# Patient Record
Sex: FEMALE | Race: WHITE | Employment: OTHER | URBAN - METROPOLITAN AREA
[De-identification: names, ages, dates, MRNs, and addresses within clinical notes are randomized per-mention and may not be internally consistent; named-entity substitution may affect disease eponyms.]

---

## 2023-11-17 ENCOUNTER — OFFICE VISIT (OUTPATIENT)
Age: 88
End: 2023-11-17
Payer: MEDICARE

## 2023-11-17 VITALS
HEIGHT: 63 IN | OXYGEN SATURATION: 99 % | TEMPERATURE: 98.3 F | RESPIRATION RATE: 16 BRPM | HEART RATE: 83 BPM | BODY MASS INDEX: 22.08 KG/M2 | DIASTOLIC BLOOD PRESSURE: 68 MMHG | SYSTOLIC BLOOD PRESSURE: 138 MMHG | WEIGHT: 124.6 LBS

## 2023-11-17 DIAGNOSIS — R53.83 OTHER FATIGUE: ICD-10-CM

## 2023-11-17 DIAGNOSIS — E53.8 B12 DEFICIENCY: ICD-10-CM

## 2023-11-17 DIAGNOSIS — F32.A DEPRESSION, UNSPECIFIED DEPRESSION TYPE: ICD-10-CM

## 2023-11-17 DIAGNOSIS — E61.1 IRON DEFICIENCY: ICD-10-CM

## 2023-11-17 DIAGNOSIS — E78.5 HYPERLIPIDEMIA, UNSPECIFIED HYPERLIPIDEMIA TYPE: ICD-10-CM

## 2023-11-17 DIAGNOSIS — R00.2 PALPITATION: ICD-10-CM

## 2023-11-17 DIAGNOSIS — Z86.73 HISTORY OF CVA (CEREBROVASCULAR ACCIDENT): ICD-10-CM

## 2023-11-17 DIAGNOSIS — D64.9 ANEMIA, UNSPECIFIED TYPE: Primary | ICD-10-CM

## 2023-11-17 DIAGNOSIS — E55.9 VITAMIN D DEFICIENCY: ICD-10-CM

## 2023-11-17 PROCEDURE — 99204 OFFICE O/P NEW MOD 45 MIN: CPT | Performed by: FAMILY MEDICINE

## 2023-11-17 PROCEDURE — 1123F ACP DISCUSS/DSCN MKR DOCD: CPT | Performed by: FAMILY MEDICINE

## 2023-11-17 RX ORDER — ASPIRIN 81 MG/1
81 TABLET ORAL DAILY
COMMUNITY

## 2023-11-17 RX ORDER — DOXYCYCLINE HYCLATE 100 MG/1
100 CAPSULE ORAL 2 TIMES DAILY
COMMUNITY
Start: 2023-11-10 | End: 2023-11-20

## 2023-11-17 RX ORDER — ATORVASTATIN CALCIUM 10 MG/1
10 TABLET, FILM COATED ORAL DAILY
COMMUNITY

## 2023-11-17 RX ORDER — TRAVOPROST OPHTHALMIC SOLUTION 0.04 MG/ML
SOLUTION OPHTHALMIC
COMMUNITY
Start: 2023-10-11

## 2023-11-17 SDOH — ECONOMIC STABILITY: FOOD INSECURITY: WITHIN THE PAST 12 MONTHS, THE FOOD YOU BOUGHT JUST DIDN'T LAST AND YOU DIDN'T HAVE MONEY TO GET MORE.: NEVER TRUE

## 2023-11-17 SDOH — ECONOMIC STABILITY: INCOME INSECURITY: HOW HARD IS IT FOR YOU TO PAY FOR THE VERY BASICS LIKE FOOD, HOUSING, MEDICAL CARE, AND HEATING?: NOT HARD AT ALL

## 2023-11-17 SDOH — ECONOMIC STABILITY: FOOD INSECURITY: WITHIN THE PAST 12 MONTHS, YOU WORRIED THAT YOUR FOOD WOULD RUN OUT BEFORE YOU GOT MONEY TO BUY MORE.: NEVER TRUE

## 2023-11-17 SDOH — ECONOMIC STABILITY: HOUSING INSECURITY
IN THE LAST 12 MONTHS, WAS THERE A TIME WHEN YOU DID NOT HAVE A STEADY PLACE TO SLEEP OR SLEPT IN A SHELTER (INCLUDING NOW)?: NO

## 2023-11-17 ASSESSMENT — PATIENT HEALTH QUESTIONNAIRE - PHQ9
SUM OF ALL RESPONSES TO PHQ QUESTIONS 1-9: 2
SUM OF ALL RESPONSES TO PHQ QUESTIONS 1-9: 2
2. FEELING DOWN, DEPRESSED OR HOPELESS: 1
SUM OF ALL RESPONSES TO PHQ QUESTIONS 1-9: 2
SUM OF ALL RESPONSES TO PHQ9 QUESTIONS 1 & 2: 2
1. LITTLE INTEREST OR PLEASURE IN DOING THINGS: 1
SUM OF ALL RESPONSES TO PHQ QUESTIONS 1-9: 2

## 2023-11-18 LAB
25(OH)D3 SERPL-MCNC: 51.2 NG/ML (ref 30–100)
BASOPHILS # BLD: 0 K/UL (ref 0–0.1)
BASOPHILS NFR BLD: 0 % (ref 0–1)
CHOLEST SERPL-MCNC: 82 MG/DL
DIFFERENTIAL METHOD BLD: ABNORMAL
EOSINOPHIL # BLD: 0 K/UL (ref 0–0.4)
EOSINOPHIL NFR BLD: 1 % (ref 0–7)
ERYTHROCYTE [DISTWIDTH] IN BLOOD BY AUTOMATED COUNT: 27.1 % (ref 11.5–14.5)
FERRITIN SERPL-MCNC: 533 NG/ML (ref 8–252)
FOLATE SERPL-MCNC: 11.3 NG/ML (ref 5–21)
HCT VFR BLD AUTO: 27.3 % (ref 35–47)
HDLC SERPL-MCNC: 50 MG/DL
HDLC SERPL: 1.6 (ref 0–5)
HGB BLD-MCNC: 7.9 G/DL (ref 11.5–16)
IMM GRANULOCYTES # BLD AUTO: 0 K/UL (ref 0–0.04)
IMM GRANULOCYTES NFR BLD AUTO: 1 % (ref 0–0.5)
IRON SATN MFR SERPL: 94 % (ref 20–50)
IRON SERPL-MCNC: 170 UG/DL (ref 35–150)
LDLC SERPL CALC-MCNC: 24 MG/DL (ref 0–100)
LYMPHOCYTES # BLD: 1.3 K/UL (ref 0.8–3.5)
LYMPHOCYTES NFR BLD: 31 % (ref 12–49)
MCH RBC QN AUTO: 23.8 PG (ref 26–34)
MCHC RBC AUTO-ENTMCNC: 28.9 G/DL (ref 30–36.5)
MCV RBC AUTO: 82.2 FL (ref 80–99)
MONOCYTES # BLD: 0.5 K/UL (ref 0–1)
MONOCYTES NFR BLD: 11 % (ref 5–13)
NEUTS SEG # BLD: 2.3 K/UL (ref 1.8–8)
NEUTS SEG NFR BLD: 56 % (ref 32–75)
NRBC # BLD: 0 K/UL (ref 0–0.01)
NRBC BLD-RTO: 0 PER 100 WBC
PLATELET # BLD AUTO: 398 K/UL (ref 150–400)
RBC # BLD AUTO: 3.32 M/UL (ref 3.8–5.2)
RBC MORPH BLD: ABNORMAL
TIBC SERPL-MCNC: 181 UG/DL (ref 250–450)
TRIGL SERPL-MCNC: 40 MG/DL
TSH SERPL DL<=0.05 MIU/L-ACNC: 4.02 UIU/ML (ref 0.36–3.74)
VIT B12 SERPL-MCNC: 554 PG/ML (ref 193–986)
VLDLC SERPL CALC-MCNC: 8 MG/DL
WBC # BLD AUTO: 4.1 K/UL (ref 3.6–11)

## 2023-11-20 ENCOUNTER — TELEPHONE (OUTPATIENT)
Age: 88
End: 2023-11-20

## 2023-11-20 DIAGNOSIS — E61.1 IRON DEFICIENCY: ICD-10-CM

## 2023-11-20 DIAGNOSIS — D64.9 ANEMIA, UNSPECIFIED TYPE: Primary | ICD-10-CM

## 2023-11-20 DIAGNOSIS — E53.8 B12 DEFICIENCY: ICD-10-CM

## 2023-11-20 NOTE — TELEPHONE ENCOUNTER
Pt's granddaughter Pily notified and voiced understanding.  Pily states her mother will call back to schedule lab appointment

## 2023-11-20 NOTE — TELEPHONE ENCOUNTER
Hemoglobin 7.9 is about where we expect after her blood transfusion. As of now appears to have the building blocks to make new blood cells. However we should repeat these labs in about 2 weeks (lab visit) to make sure that she continues moving in the right direction. It is possible that we will see vitamin deficiencies develop that we will need to be corrected.       Would recommend a hematology (blood doctor referral) to have an extra set of eyes on this    Bran Aburto MD, Hematology/Oncology   [address] [place] [address]   [phone]

## 2023-11-29 ENCOUNTER — NURSE ONLY (OUTPATIENT)
Age: 88
End: 2023-11-29

## 2023-11-29 DIAGNOSIS — D64.9 ANEMIA, UNSPECIFIED TYPE: ICD-10-CM

## 2023-11-29 DIAGNOSIS — E61.1 IRON DEFICIENCY: ICD-10-CM

## 2023-11-29 DIAGNOSIS — E53.8 B12 DEFICIENCY: ICD-10-CM

## 2023-11-29 LAB
FERRITIN SERPL-MCNC: 577 NG/ML (ref 26–388)
FOLATE SERPL-MCNC: 10.7 NG/ML (ref 5–21)
IRON SATN MFR SERPL: 88 % (ref 20–50)
IRON SERPL-MCNC: 171 UG/DL (ref 35–150)
TIBC SERPL-MCNC: 195 UG/DL (ref 250–450)
VIT B12 SERPL-MCNC: 517 PG/ML (ref 193–986)

## 2023-12-04 ENCOUNTER — TELEPHONE (OUTPATIENT)
Age: 88
End: 2023-12-04

## 2023-12-04 DIAGNOSIS — D64.9 ANEMIA, UNSPECIFIED TYPE: Primary | ICD-10-CM

## 2023-12-04 NOTE — TELEPHONE ENCOUNTER
Spoke with [address] [place] [address]. They have the CBC results. They will be faxing over results today 12/4/23.

## 2023-12-05 ENCOUNTER — TELEPHONE (OUTPATIENT)
Age: 88
End: 2023-12-05

## 2023-12-05 RX ORDER — PANTOPRAZOLE SODIUM 40 MG/1
40 TABLET, DELAYED RELEASE ORAL 2 TIMES DAILY
Qty: 60 TABLET | Refills: 1 | Status: SHIPPED | OUTPATIENT
Start: 2023-12-05

## 2023-12-05 NOTE — TELEPHONE ENCOUNTER
Started feeling more tired 12/01 blood count was 6.2    Pt is on her way to [address] [place] [address] [address] for a blood transfusion 12/05

## 2023-12-05 NOTE — TELEPHONE ENCOUNTER
Reviewed results found under media. Lab draw 11/29/2023  Hemoglobin 6.2  MCV 83.1  Platelets 293    This represents a drop in her hemoglobin from 7.9 on 11/17. Called the number on her chart. Turns out the numbers listed are for her daughter and granddaughter. I got touch with her granddaughter Lashonda Mina who is a nurse at [address] She is listed on disclosure. Evidently patient requested help from her family members in navigating the healthcare system. Patient not available to see how she is feeling. Pily has not heard that she has been feeling poorly    Explained my concern is that she is continuing to lose blood. Probably losing it from the GI tract  I advised her to stop aspirin  Start Protonix twice daily for the possibility of an upper GI bleed  She is below the transfusion threshold. Recommend proceeding to the emergency room for a transfusion. Follow-up for a lab visit CBC 1 week after transfusion    She had previously declined to consider GI consult or intervention. Granddaughter will discuss this with patient and would be happy to refer if she changes mind.     Has a hematology appointment next week

## 2023-12-11 ENCOUNTER — OFFICE VISIT (OUTPATIENT)
Age: 88
End: 2023-12-11
Payer: MEDICARE

## 2023-12-11 VITALS
RESPIRATION RATE: 18 BRPM | DIASTOLIC BLOOD PRESSURE: 70 MMHG | WEIGHT: 125.6 LBS | HEIGHT: 63 IN | SYSTOLIC BLOOD PRESSURE: 133 MMHG | TEMPERATURE: 97.6 F | OXYGEN SATURATION: 98 % | BODY MASS INDEX: 22.25 KG/M2

## 2023-12-11 DIAGNOSIS — D61.89 ANEMIA DUE TO OTHER BONE MARROW FAILURE (HCC): Primary | ICD-10-CM

## 2023-12-11 PROCEDURE — 1123F ACP DISCUSS/DSCN MKR DOCD: CPT | Performed by: INTERNAL MEDICINE

## 2023-12-11 PROCEDURE — 99205 OFFICE O/P NEW HI 60 MIN: CPT | Performed by: INTERNAL MEDICINE

## 2023-12-11 NOTE — PROGRESS NOTES
[place]  at [facility], [address], [facility], [address] [place], [place] [address]  [phone]    Hematology Note        Patient: Abbe Marcano MRN: [id number]  SSN: [id number]    YOB: 1935  Age: 80 y.o. Sex: female      Subjective:      Abbe Marcano is a 80 y.o. female who I am seeing for anemia. She has been experiencing progressive dyspnea. Anemia has worsened over the last few months. She has received RBC transfusion which improved the Hgb but it continues to drift downwards. She is fatigued and comes in to discuss the next steps. She is accompanied by her daughter. Review of Systems:  Constitutional: positive for fatigue  Eyes: negative  Ears, Nose, Mouth, Throat, and Face: negative  Respiratory: negative  Cardiovascular: negative  Gastrointestinal: negative  Genitourinary:negative  Integument/Breast: negative  Hematologic/Lymphatic: negative  Musculoskeletal:negative  Neurological: negative      History reviewed. No pertinent past medical history. History reviewed. No pertinent surgical history. History reviewed. No pertinent family history. Social History     Tobacco Use    Smoking status: Never    Smokeless tobacco: Never   Substance Use Topics    Alcohol use: Not Currently      Prior to Admission medications    Medication Sig Start Date End Date Taking?  Authorizing Provider   pantoprazole (PROTONIX) 40 MG tablet Take 1 tablet by mouth in the morning and at bedtime 12/5/23  Yes Lenore Medina MD   atorvastatin (LIPITOR) 10 MG tablet Take 1 tablet by mouth daily   Yes Delores Zavala MD   Travoprost, MAGY Free, (TRAVATAN Z) 0.004 % SOLN ophthalmic solution  10/11/23  Yes Delores Zavala MD   aspirin 81 MG EC tablet Take 1 tablet by mouth daily  Patient not taking: Reported on 12/11/2023    Delores Zavala MD            Allergies   Allergen Reactions    Codeine Other (See Comments)    Diazepam Other

## 2023-12-12 ENCOUNTER — TELEPHONE (OUTPATIENT)
Age: 88
End: 2023-12-12

## 2023-12-12 NOTE — TELEPHONE ENCOUNTER
Called pt daughter. HIPAA verified by two patient identifiers. Fine to go to PCP as we share a system. They will call her PCP and get these drawn.

## 2023-12-12 NOTE — TELEPHONE ENCOUNTER
Pt called left a voicemail. Pt went over to get blood work yesterday they missed them by 5 mins. Pt is wondering can they get their blood work done at Dr. Elvia Peters office in [place], or do they need to come back here.

## 2023-12-12 NOTE — TELEPHONE ENCOUNTER
Pt's daughter calling; pt has a lab appt set for 12/14/2023; pt has active labs in her chart labs (provider is a [facility] provider but not in [facility] office    No call needed at this time

## 2023-12-14 ENCOUNTER — NURSE ONLY (OUTPATIENT)
Age: 88
End: 2023-12-14

## 2023-12-14 ENCOUNTER — HOSPITAL ENCOUNTER (OUTPATIENT)
Facility: HOSPITAL | Age: 88
Discharge: HOME | End: 2023-12-14
Attending: INTERNAL MEDICINE
Payer: MEDICARE

## 2023-12-14 DIAGNOSIS — D61.89 ANEMIA DUE TO OTHER BONE MARROW FAILURE (HCC): ICD-10-CM

## 2023-12-14 LAB
BASOPHILS # BLD: 0 K/UL (ref 0–0.1)
BASOPHILS NFR BLD: 0 % (ref 0–1)
DIFFERENTIAL METHOD BLD: ABNORMAL
EOSINOPHIL # BLD: 0 K/UL (ref 0–0.4)
EOSINOPHIL NFR BLD: 0 % (ref 0–7)
ERYTHROCYTE [DISTWIDTH] IN BLOOD BY AUTOMATED COUNT: 23.3 % (ref 11.5–14.5)
HCT VFR BLD AUTO: 31 % (ref 35–47)
HGB BLD-MCNC: 9.3 G/DL (ref 11.5–16)
IMM GRANULOCYTES # BLD AUTO: 0 K/UL (ref 0–0.04)
IMM GRANULOCYTES NFR BLD AUTO: 1 % (ref 0–0.5)
LDH SERPL L TO P-CCNC: 179 U/L (ref 81–246)
LYMPHOCYTES # BLD: 0.9 K/UL (ref 0.8–3.5)
LYMPHOCYTES NFR BLD: 24 % (ref 12–49)
MCH RBC QN AUTO: 25.8 PG (ref 26–34)
MCHC RBC AUTO-ENTMCNC: 30 G/DL (ref 30–36.5)
MCV RBC AUTO: 86.1 FL (ref 80–99)
MONOCYTES # BLD: 0.3 K/UL (ref 0–1)
MONOCYTES NFR BLD: 7 % (ref 5–13)
NEUTS SEG # BLD: 2.5 K/UL (ref 1.8–8)
NEUTS SEG NFR BLD: 68 % (ref 32–75)
NRBC # BLD: 0 K/UL (ref 0–0.01)
NRBC BLD-RTO: 0 PER 100 WBC
PLATELET # BLD AUTO: 335 K/UL (ref 150–400)
PLATELET COMMENT: ABNORMAL
RBC # BLD AUTO: 3.6 M/UL (ref 3.8–5.2)
RBC MORPH BLD: ABNORMAL
RETICS # AUTO: 0.18 M/UL (ref 0.02–0.08)
RETICS/RBC NFR AUTO: 4.6 % (ref 0.7–2.1)
WBC # BLD AUTO: 3.7 K/UL (ref 3.6–11)

## 2023-12-14 PROCEDURE — 76700 US EXAM ABDOM COMPLETE: CPT

## 2023-12-15 ENCOUNTER — TELEPHONE (OUTPATIENT)
Age: 88
End: 2023-12-15

## 2023-12-16 LAB — EPO SERPL-ACNC: 28.5 MIU/ML (ref 2.6–18.5)

## 2023-12-21 ENCOUNTER — HOSPITAL ENCOUNTER (OUTPATIENT)
Facility: HOSPITAL | Age: 88
Discharge: HOME | End: 2023-12-24
Payer: MEDICARE

## 2023-12-21 VITALS
TEMPERATURE: 98.3 F | RESPIRATION RATE: 17 BRPM | DIASTOLIC BLOOD PRESSURE: 67 MMHG | WEIGHT: 125 LBS | HEART RATE: 76 BPM | BODY MASS INDEX: 22.15 KG/M2 | OXYGEN SATURATION: 100 % | HEIGHT: 63 IN | SYSTOLIC BLOOD PRESSURE: 145 MMHG

## 2023-12-21 DIAGNOSIS — D61.89 ANEMIA DUE TO OTHER BONE MARROW FAILURE (HCC): ICD-10-CM

## 2023-12-21 LAB
ALBUMIN SERPL ELPH-MCNC: 3.7 G/DL (ref 2.9–4.4)
ALBUMIN/GLOB SERPL: 1.3 (ref 0.7–1.7)
ALPHA1 GLOB SERPL ELPH-MCNC: 0.3 G/DL (ref 0–0.4)
ALPHA2 GLOB SERPL ELPH-MCNC: 0.6 G/DL (ref 0.4–1)
B-GLOBULIN SERPL ELPH-MCNC: 1 G/DL (ref 0.7–1.3)
BASOPHILS # BLD: 0 K/UL (ref 0–0.1)
BASOPHILS NFR BLD: 0 % (ref 0–1)
DIFFERENTIAL METHOD BLD: ABNORMAL
EOSINOPHIL # BLD: 0 K/UL (ref 0–0.4)
EOSINOPHIL NFR BLD: 0 % (ref 0–7)
ERYTHROCYTE [DISTWIDTH] IN BLOOD BY AUTOMATED COUNT: 22.9 % (ref 11.5–14.5)
GAMMA GLOB SERPL ELPH-MCNC: 1.2 G/DL (ref 0.4–1.8)
GLOBULIN SER-MCNC: 3 G/DL (ref 2.2–3.9)
HCT VFR BLD AUTO: 26.9 % (ref 35–47)
HGB BLD-MCNC: 8.5 G/DL (ref 11.5–16)
IGA SERPL-MCNC: 526 MG/DL (ref 64–422)
IGG SERPL-MCNC: 1293 MG/DL (ref 586–1602)
IGM SERPL-MCNC: 101 MG/DL (ref 26–217)
IMM GRANULOCYTES # BLD AUTO: 0.1 K/UL (ref 0–0.04)
IMM GRANULOCYTES NFR BLD AUTO: 2 % (ref 0–0.5)
INTERPRETATION SERPL IEP-IMP: ABNORMAL
KAPPA LC FREE SER-MCNC: 56.2 MG/L (ref 3.3–19.4)
KAPPA LC FREE/LAMBDA FREE SER: 1.52 (ref 0.26–1.65)
LAMBDA LC FREE SERPL-MCNC: 36.9 MG/L (ref 5.7–26.3)
LYMPHOCYTES # BLD: 1.4 K/UL (ref 0.8–3.5)
LYMPHOCYTES NFR BLD: 36 % (ref 12–49)
M PROTEIN SERPL ELPH-MCNC: ABNORMAL G/DL
MCH RBC QN AUTO: 26.2 PG (ref 26–34)
MCHC RBC AUTO-ENTMCNC: 31.6 G/DL (ref 30–36.5)
MCV RBC AUTO: 82.8 FL (ref 80–99)
MONOCYTES # BLD: 0.3 K/UL (ref 0–1)
MONOCYTES NFR BLD: 7 % (ref 5–13)
NEUTS SEG # BLD: 2.2 K/UL (ref 1.8–8)
NEUTS SEG NFR BLD: 55 % (ref 32–75)
NRBC # BLD: 0 K/UL (ref 0–0.01)
NRBC BLD-RTO: 0 PER 100 WBC
PLATELET # BLD AUTO: 295 K/UL (ref 150–400)
PMV BLD AUTO: 10.6 FL (ref 8.9–12.9)
PROT SERPL-MCNC: 6.7 G/DL (ref 6–8.5)
RBC # BLD AUTO: 3.25 M/UL (ref 3.8–5.2)
RBC MORPH BLD: ABNORMAL
WBC # BLD AUTO: 4 K/UL (ref 3.6–11)

## 2023-12-21 PROCEDURE — 6360000002 HC RX W HCPCS: Performed by: STUDENT IN AN ORGANIZED HEALTH CARE EDUCATION/TRAINING PROGRAM

## 2023-12-21 PROCEDURE — 36415 COLL VENOUS BLD VENIPUNCTURE: CPT

## 2023-12-21 PROCEDURE — 88313 SPECIAL STAINS GROUP 2: CPT

## 2023-12-21 PROCEDURE — 85025 COMPLETE CBC W/AUTO DIFF WBC: CPT

## 2023-12-21 PROCEDURE — 88341 IMHCHEM/IMCYTCHM EA ADD ANTB: CPT

## 2023-12-21 PROCEDURE — 88342 IMHCHEM/IMCYTCHM 1ST ANTB: CPT

## 2023-12-21 PROCEDURE — 88185 FLOWCYTOMETRY/TC ADD-ON: CPT

## 2023-12-21 PROCEDURE — 88311 DECALCIFY TISSUE: CPT

## 2023-12-21 PROCEDURE — 2580000003 HC RX 258: Performed by: STUDENT IN AN ORGANIZED HEALTH CARE EDUCATION/TRAINING PROGRAM

## 2023-12-21 PROCEDURE — 88305 TISSUE EXAM BY PATHOLOGIST: CPT

## 2023-12-21 PROCEDURE — 88184 FLOWCYTOMETRY/ TC 1 MARKER: CPT

## 2023-12-21 PROCEDURE — 99156 MOD SED OTH PHYS/QHP 5/>YRS: CPT

## 2023-12-21 RX ORDER — SODIUM CHLORIDE 9 MG/ML
INJECTION, SOLUTION INTRAVENOUS CONTINUOUS
Status: DISCONTINUED | OUTPATIENT
Start: 2023-12-21 | End: 2023-12-21

## 2023-12-21 RX ORDER — FENTANYL CITRATE 50 UG/ML
100 INJECTION, SOLUTION INTRAMUSCULAR; INTRAVENOUS
Status: DISCONTINUED | OUTPATIENT
Start: 2023-12-21 | End: 2023-12-21

## 2023-12-21 RX ORDER — MIDAZOLAM HYDROCHLORIDE 1 MG/ML
5 INJECTION, SOLUTION INTRAMUSCULAR; INTRAVENOUS
Status: DISCONTINUED | OUTPATIENT
Start: 2023-12-21 | End: 2023-12-21

## 2023-12-21 RX ADMIN — SODIUM CHLORIDE: 9 INJECTION, SOLUTION INTRAVENOUS at 12:14

## 2023-12-21 RX ADMIN — FENTANYL CITRATE 50 MCG: 50 INJECTION INTRAMUSCULAR; INTRAVENOUS at 12:39

## 2023-12-21 RX ADMIN — MIDAZOLAM 2 MG: 1 INJECTION INTRAMUSCULAR; INTRAVENOUS at 12:35

## 2023-12-21 RX ADMIN — FENTANYL CITRATE 50 MCG: 50 INJECTION INTRAMUSCULAR; INTRAVENOUS at 12:36

## 2023-12-21 NOTE — DISCHARGE INSTRUCTIONS
[facility]  [facility]iology [facility]  [phone]    Radiologist:  Psychiatric Radiology    Date:  12/21/2023       Bone Marrow Biopsy Discharge Instructions      Go home and rest  and restrict your activity the next 24 hours. You have been given sedating medications, so do not drive or make important decisions today. Resume your previous diet and prescribed medications. You may shower in 24 hours. Do not soak or swim until the biopsy site has healed completely to minimize any risk of infection. Watch site for redness, drainage, pus, foul odor, increasing pain and fevers. Should this occur call you doctor immediatly. You may take Tylenol if allowed, as directed on the label, for pain or discomfort. Avoid Ibuprofen (Advil, Motrin etc.) and Aspirin today as they may increase your risk of bleeding. Be sure to follow up with your physician, and let him know how you are progressing and to receive your results. If you have any questions about your procedure today please call and ask to speak to a radiology nurse.        I

## 2024-01-02 NOTE — PROGRESS NOTES
Esther Kaiser is a 88 y.o. female here for follow up for anemia.  Bone marrow biopsy 12/21/23.   Pt just starting with the cough and congestion. She is more fatigued.   Will see cardiologist on the 11th.    1. Have you been to the ER, urgent care clinic since your last visit?  Hospitalized since your last visit? no    2. Have you seen or consulted any other health care providers outside of the [facility] since your last visit?  Include any pap smears or colon screening.  no

## 2024-01-04 ENCOUNTER — OFFICE VISIT (OUTPATIENT)
Age: 89
End: 2024-01-04
Payer: MEDICARE

## 2024-01-04 VITALS
WEIGHT: 127.8 LBS | OXYGEN SATURATION: 96 % | HEART RATE: 95 BPM | SYSTOLIC BLOOD PRESSURE: 138 MMHG | DIASTOLIC BLOOD PRESSURE: 68 MMHG | TEMPERATURE: 98.2 F | BODY MASS INDEX: 22.64 KG/M2 | HEIGHT: 63 IN

## 2024-01-04 DIAGNOSIS — D46.9 MDS (MYELODYSPLASTIC SYNDROME) (HCC): Primary | ICD-10-CM

## 2024-01-04 DIAGNOSIS — D46.4 REFRACTORY ANEMIA DUE TO MYELODYSPLASTIC SYNDROME (HCC): Primary | ICD-10-CM

## 2024-01-04 PROCEDURE — 99215 OFFICE O/P EST HI 40 MIN: CPT | Performed by: INTERNAL MEDICINE

## 2024-01-04 PROCEDURE — 1123F ACP DISCUSS/DSCN MKR DOCD: CPT | Performed by: INTERNAL MEDICINE

## 2024-01-04 NOTE — PROGRESS NOTES
[facility] [place]  at [facility]  [address] [facility], [address]  [place] [address]  [phone]    Hematology Note        Patient: Esther Kaiser MRN: [id number]  SSN: [id number]    YOB: 1935  Age: 88 y.o.  Sex: female      Subjective:      Esther Kaiser is a 88 y.o. female who I am seeing for anemia. She has been experiencing progressive dyspnea. Anemia has worsened over the last few months. She has received RBC transfusion which improved the Hgb but it continues to drift downwards. She is fatigued. A bone marrow Bx reveals a Dx of MDS. She is accompanied by her daughter.       Review of Systems:  Constitutional: positive for fatigue  Eyes: negative  Ears, Nose, Mouth, Throat, and Face: negative  Respiratory: negative  Cardiovascular: negative  Gastrointestinal: negative  Genitourinary:negative  Integument/Breast: negative  Hematologic/Lymphatic: negative  Musculoskeletal:negative  Neurological: negative      No past medical history on file.  No past surgical history on file.   No family history on file.  Social History     Tobacco Use    Smoking status: Never    Smokeless tobacco: Never   Substance Use Topics    Alcohol use: Not Currently      Prior to Admission medications    Medication Sig Start Date End Date Taking? Authorizing Provider   pantoprazole (PROTONIX) 40 MG tablet Take 1 tablet by mouth in the morning and at bedtime 12/5/23  Yes Messi Stock MD   atorvastatin (LIPITOR) 10 MG tablet Take 1 tablet by mouth daily   Yes Delores Zavala MD   Travoprost, MAGY Free, (TRAVATAN Z) 0.004 % SOLN ophthalmic solution  10/11/23  Yes Delores Zavala MD   aspirin 81 MG EC tablet Take 1 tablet by mouth daily  Patient not taking: Reported on 12/11/2023    ProviderDelores MD            Allergies   Allergen Reactions    Codeine Other (See Comments)    Diazepam Other (See Comments)    Penicillins Other (See Comments)

## 2024-01-05 DIAGNOSIS — D46.4 REFRACTORY ANEMIA DUE TO MYELODYSPLASTIC SYNDROME (HCC): Primary | ICD-10-CM

## 2024-01-05 RX ORDER — DIPHENHYDRAMINE HYDROCHLORIDE 50 MG/ML
50 INJECTION INTRAMUSCULAR; INTRAVENOUS
OUTPATIENT
Start: 2024-01-18

## 2024-01-05 RX ORDER — ALBUTEROL SULFATE 90 UG/1
4 AEROSOL, METERED RESPIRATORY (INHALATION) PRN
OUTPATIENT
Start: 2024-01-18

## 2024-01-05 RX ORDER — SODIUM CHLORIDE 9 MG/ML
INJECTION, SOLUTION INTRAVENOUS CONTINUOUS
OUTPATIENT
Start: 2024-01-18

## 2024-01-05 RX ORDER — ACETAMINOPHEN 325 MG/1
650 TABLET ORAL
OUTPATIENT
Start: 2024-01-18

## 2024-01-05 RX ORDER — EPINEPHRINE 1 MG/ML
0.3 INJECTION, SOLUTION, CONCENTRATE INTRAVENOUS PRN
OUTPATIENT
Start: 2024-01-18

## 2024-01-05 RX ORDER — ONDANSETRON 2 MG/ML
8 INJECTION INTRAMUSCULAR; INTRAVENOUS
OUTPATIENT
Start: 2024-01-18

## 2024-01-08 ENCOUNTER — TELEPHONE (OUTPATIENT)
Age: 89
End: 2024-01-08

## 2024-01-08 NOTE — TELEPHONE ENCOUNTER
Pt daughter called and left a VM on 1/8/24. She stated they left without getting bone marrow results. Wondering if they could be email to call her and let her know.

## 2024-01-10 DIAGNOSIS — D46.4 REFRACTORY ANEMIA DUE TO MYELODYSPLASTIC SYNDROME (HCC): Primary | ICD-10-CM

## 2024-01-10 RX ORDER — ATORVASTATIN CALCIUM 10 MG/1
10 TABLET, FILM COATED ORAL DAILY
Qty: 90 TABLET | Refills: 3 | Status: SHIPPED | OUTPATIENT
Start: 2024-01-10

## 2024-01-10 NOTE — TELEPHONE ENCOUNTER
Refill request (pt daughter calling)    Pt is out    atorvastatin (LIPITOR) 10 MG tablet     [facility] in [place]

## 2024-01-10 NOTE — TELEPHONE ENCOUNTER
Patients daughter called back returning a call. Informed her that the nurse called to verify the email on file. Email is correct that is on file.     She also informed me that she missed a call about scheduling an appointment for a shot.    # [phone]

## 2024-01-11 ENCOUNTER — CLINICAL DOCUMENTATION (OUTPATIENT)
Facility: HOSPITAL | Age: 89
End: 2024-01-11

## 2024-01-11 NOTE — PROGRESS NOTES
Patient Assistance    Met with:     Navigator Type: Infusion  Documentation Type: New Patient  Contact Type: Telephone  Navigation Status: Copay Enrollment          Additional notes:     Drug Name: OTHER  Other Drug Name: Reblozyl  Form of PAP Assistance: [employer] Assistance

## 2024-01-15 ENCOUNTER — TELEPHONE (OUTPATIENT)
Age: 89
End: 2024-01-15

## 2024-01-15 NOTE — TELEPHONE ENCOUNTER
Verito left a VM on 1/15/24 at 8:35am. She needs to reschedule her mothers infusion appt on 1/19/24.

## 2024-01-19 ENCOUNTER — HOSPITAL ENCOUNTER (OUTPATIENT)
Facility: HOSPITAL | Age: 89
Setting detail: INFUSION SERIES
End: 2024-01-19
Payer: MEDICARE

## 2024-01-22 ENCOUNTER — OFFICE VISIT (OUTPATIENT)
Age: 89
End: 2024-01-22
Payer: MEDICARE

## 2024-01-22 ENCOUNTER — HOSPITAL ENCOUNTER (OUTPATIENT)
Facility: HOSPITAL | Age: 89
Setting detail: INFUSION SERIES
Discharge: HOME | End: 2024-01-22
Payer: MEDICARE

## 2024-01-22 VITALS
OXYGEN SATURATION: 96 % | WEIGHT: 124.3 LBS | HEIGHT: 63 IN | RESPIRATION RATE: 17 BRPM | BODY MASS INDEX: 22.02 KG/M2 | SYSTOLIC BLOOD PRESSURE: 146 MMHG | DIASTOLIC BLOOD PRESSURE: 73 MMHG | HEART RATE: 96 BPM | TEMPERATURE: 96.5 F

## 2024-01-22 VITALS
OXYGEN SATURATION: 97 % | BODY MASS INDEX: 22.03 KG/M2 | WEIGHT: 124.34 LBS | HEIGHT: 63 IN | SYSTOLIC BLOOD PRESSURE: 148 MMHG | HEART RATE: 137 BPM | RESPIRATION RATE: 16 BRPM | DIASTOLIC BLOOD PRESSURE: 77 MMHG | TEMPERATURE: 96.5 F

## 2024-01-22 DIAGNOSIS — D46.4 REFRACTORY ANEMIA DUE TO MYELODYSPLASTIC SYNDROME (HCC): ICD-10-CM

## 2024-01-22 DIAGNOSIS — D46.4 REFRACTORY ANEMIA DUE TO MYELODYSPLASTIC SYNDROME (HCC): Primary | ICD-10-CM

## 2024-01-22 DIAGNOSIS — D46.9 MDS (MYELODYSPLASTIC SYNDROME) (HCC): Primary | ICD-10-CM

## 2024-01-22 DIAGNOSIS — D61.89 ANEMIA DUE TO OTHER BONE MARROW FAILURE (HCC): ICD-10-CM

## 2024-01-22 DIAGNOSIS — Z51.11 ENCOUNTER FOR ANTINEOPLASTIC CHEMOTHERAPY: ICD-10-CM

## 2024-01-22 LAB
BASOPHILS # BLD: 0 K/UL (ref 0–0.1)
BASOPHILS NFR BLD: 0 % (ref 0–1)
DIFFERENTIAL METHOD BLD: ABNORMAL
EOSINOPHIL # BLD: 0 K/UL (ref 0–0.4)
EOSINOPHIL NFR BLD: 0 % (ref 0–7)
ERYTHROCYTE [DISTWIDTH] IN BLOOD BY AUTOMATED COUNT: 22.7 % (ref 11.5–14.5)
HCT VFR BLD AUTO: 19.4 % (ref 35–47)
HGB BLD-MCNC: 5.8 G/DL (ref 11.5–16)
HISTORY CHECK: NORMAL
IMM GRANULOCYTES # BLD AUTO: 0 K/UL (ref 0–0.04)
IMM GRANULOCYTES NFR BLD AUTO: 0 % (ref 0–0.5)
LYMPHOCYTES # BLD: 1.2 K/UL (ref 0.8–3.5)
LYMPHOCYTES NFR BLD: 25 % (ref 12–49)
MCH RBC QN AUTO: 25.4 PG (ref 26–34)
MCHC RBC AUTO-ENTMCNC: 29.9 G/DL (ref 30–36.5)
MCV RBC AUTO: 85.1 FL (ref 80–99)
METAMYELOCYTES NFR BLD MANUAL: 2 %
MONOCYTES # BLD: 0.1 K/UL (ref 0–1)
MONOCYTES NFR BLD: 3 % (ref 5–13)
NEUTS BAND NFR BLD MANUAL: 1 %
NEUTS SEG # BLD: 3.4 K/UL (ref 1.8–8)
NEUTS SEG NFR BLD: 69 % (ref 32–75)
NRBC # BLD: 0.07 K/UL (ref 0–0.01)
NRBC BLD-RTO: 1.4 PER 100 WBC
PATH REV BLD -IMP: NORMAL
PLATELET # BLD AUTO: 189 K/UL (ref 150–400)
RBC # BLD AUTO: 2.28 M/UL (ref 3.8–5.2)
RBC MORPH BLD: ABNORMAL
WBC # BLD AUTO: 4.8 K/UL (ref 3.6–11)

## 2024-01-22 PROCEDURE — 85025 COMPLETE CBC W/AUTO DIFF WBC: CPT

## 2024-01-22 PROCEDURE — 96372 THER/PROPH/DIAG INJ SC/IM: CPT

## 2024-01-22 PROCEDURE — 88275 CYTOGENETICS 100-300: CPT

## 2024-01-22 PROCEDURE — 86850 RBC ANTIBODY SCREEN: CPT

## 2024-01-22 PROCEDURE — 86923 COMPATIBILITY TEST ELECTRIC: CPT

## 2024-01-22 PROCEDURE — 86901 BLOOD TYPING SEROLOGIC RH(D): CPT

## 2024-01-22 PROCEDURE — 6360000002 HC RX W HCPCS: Performed by: INTERNAL MEDICINE

## 2024-01-22 PROCEDURE — 36415 COLL VENOUS BLD VENIPUNCTURE: CPT

## 2024-01-22 PROCEDURE — 86900 BLOOD TYPING SEROLOGIC ABO: CPT

## 2024-01-22 PROCEDURE — 99215 OFFICE O/P EST HI 40 MIN: CPT | Performed by: INTERNAL MEDICINE

## 2024-01-22 PROCEDURE — 88271 CYTOGENETICS DNA PROBE: CPT

## 2024-01-22 PROCEDURE — 81270 JAK2 GENE: CPT

## 2024-01-22 PROCEDURE — 1123F ACP DISCUSS/DSCN MKR DOCD: CPT | Performed by: INTERNAL MEDICINE

## 2024-01-22 RX ORDER — ACETAMINOPHEN 325 MG/1
650 TABLET ORAL
OUTPATIENT
Start: 2024-01-23

## 2024-01-22 RX ORDER — ACETAMINOPHEN 325 MG/1
650 TABLET ORAL
OUTPATIENT
Start: 2024-01-26

## 2024-01-22 RX ORDER — EPINEPHRINE 1 MG/ML
0.3 INJECTION, SOLUTION INTRAMUSCULAR; SUBCUTANEOUS PRN
OUTPATIENT
Start: 2024-01-26

## 2024-01-22 RX ORDER — ALBUTEROL SULFATE 90 UG/1
4 AEROSOL, METERED RESPIRATORY (INHALATION) PRN
OUTPATIENT
Start: 2024-01-23

## 2024-01-22 RX ORDER — DIPHENHYDRAMINE HCL 25 MG
25 TABLET ORAL ONCE
Status: CANCELLED | OUTPATIENT
Start: 2024-01-23 | End: 2024-01-23

## 2024-01-22 RX ORDER — EPINEPHRINE 1 MG/ML
0.3 INJECTION, SOLUTION, CONCENTRATE INTRAVENOUS PRN
OUTPATIENT
Start: 2024-01-23

## 2024-01-22 RX ORDER — SODIUM CHLORIDE 9 MG/ML
INJECTION, SOLUTION INTRAVENOUS CONTINUOUS
OUTPATIENT
Start: 2024-01-23

## 2024-01-22 RX ORDER — DIPHENHYDRAMINE HYDROCHLORIDE 50 MG/ML
50 INJECTION INTRAMUSCULAR; INTRAVENOUS
OUTPATIENT
Start: 2024-01-26

## 2024-01-22 RX ORDER — DIPHENHYDRAMINE HYDROCHLORIDE 50 MG/ML
50 INJECTION INTRAMUSCULAR; INTRAVENOUS
OUTPATIENT
Start: 2024-01-23

## 2024-01-22 RX ORDER — SODIUM CHLORIDE 9 MG/ML
20 INJECTION, SOLUTION INTRAVENOUS CONTINUOUS
Status: CANCELLED | OUTPATIENT
Start: 2024-01-23

## 2024-01-22 RX ORDER — SODIUM CHLORIDE 0.9 % (FLUSH) 0.9 %
5-40 SYRINGE (ML) INJECTION PRN
Status: CANCELLED | OUTPATIENT
Start: 2024-01-23

## 2024-01-22 RX ORDER — ALBUTEROL SULFATE 90 UG/1
4 AEROSOL, METERED RESPIRATORY (INHALATION) PRN
OUTPATIENT
Start: 2024-01-26

## 2024-01-22 RX ORDER — ACETAMINOPHEN 325 MG/1
650 TABLET ORAL ONCE
Status: CANCELLED | OUTPATIENT
Start: 2024-01-23 | End: 2024-01-23

## 2024-01-22 RX ORDER — SODIUM CHLORIDE 9 MG/ML
INJECTION, SOLUTION INTRAVENOUS CONTINUOUS
OUTPATIENT
Start: 2024-01-26

## 2024-01-22 RX ORDER — ONDANSETRON 2 MG/ML
8 INJECTION INTRAMUSCULAR; INTRAVENOUS
OUTPATIENT
Start: 2024-01-26

## 2024-01-22 RX ORDER — ONDANSETRON 2 MG/ML
8 INJECTION INTRAMUSCULAR; INTRAVENOUS
OUTPATIENT
Start: 2024-01-23

## 2024-01-22 RX ORDER — SODIUM CHLORIDE 9 MG/ML
25 INJECTION, SOLUTION INTRAVENOUS PRN
OUTPATIENT
Start: 2024-01-23

## 2024-01-22 RX ADMIN — LUSPATERCEPT 60 MG: 75 INJECTION, POWDER, LYOPHILIZED, FOR SOLUTION SUBCUTANEOUS at 10:59

## 2024-01-22 NOTE — PROGRESS NOTES
Esther Kaiser is a 88 y.o. female here for follow up for anemia.  Getting Luspatercept today.   Pt doing well. No concerns brought up.     1. Have you been to the ER, urgent care clinic since your last visit?  Hospitalized since your last visit?     2. Have you seen or consulted any other health care providers outside of the [facility] since your last visit?  Include any pap smears or colon screening.  [facility], Dr Evelyn Temple  
Diazepam Other (See Comments)    Penicillins Other (See Comments)     Tongue Swelling    Sulfa Antibiotics Other (See Comments)     Hyper           Objective:     Vitals:    01/22/24 0910   BP: (!) 148/77   Pulse: (!) 137   Resp: 16   Temp: (!) 96.5 °F (35.8 °C)   SpO2: 97%   Weight: 56.4 kg (124 lb 5.4 oz)   Height: 1.6 m (5' 2.99\")        PHYSICAL EXAM:    GENERAL: alert, cooperative, no distress, appears stated age  EYE: conjunctivae/corneas clear, pallor  LYMPHATIC: Cervical, supraclavicular, and axillary nodes normal.   THROAT & NECK: normal and no erythema or exudates noted.   LUNG: clear to auscultation bilaterally  HEART: regular rate and rhythm  ABDOMEN: soft, non-tender, non-distended.   EXTREMITIES:  extremities normal, atraumatic, no cyanosis or edema  SKIN: Normal.  NEUROLOGIC: AOx3. Gait normal. No gross motor/sensory deficit.      Physical exam and ROS has been modified from a prior visit to make it relevant and current          Lab Results   Component Value Date/Time    WBC 4.8 01/22/2024 08:50 AM    HGB 5.8 01/22/2024 08:50 AM    HCT 19.4 01/22/2024 08:50 AM     01/22/2024 08:50 AM    MCV 85.1 01/22/2024 08:50 AM           Lab Results   Component Value Date/Time    IRON 171 11/29/2023 02:11 PM    TIBC 195 11/29/2023 02:11 PM           Assessment:     1. Low grade MDS  ? Chronic MPN    Anemia is worse  Start Luspatercept  Blood based NGS for mutation studies  BCR-abl FISH    I called and discussed the case with hematopathologist, Dr. Shipley. The bone marrow finding is not classical of MDS. There could be an overlap syndrome of MPN/MDS. There is a also a possibility of primary myelofibrosis as well.       Plan:       1. Start Luspatercept  2. Transfuse RBC  3. Blood based NGS - mutation profile  4. BCR-abl FISH  5. Return in 3 weeks  6. May consider sending her to [facility] for an opinion      Signed by: Zander Elena MD                     January 22, 2024

## 2024-01-22 NOTE — PROGRESS NOTES
[facility] Visit Note    Pt arrived to [facility] ambulatory in no acute distress for Reblozyl.  Assessment unremarkable. Labs obtained, CBC, BCR/ABL, JAK2 V617F Cascading Reflex to CALR, JAK2 Exon 12, MPL, & CSF3R, Tempus Kit and sample to BB for PRBC.    The following medications administered:  Medications Administered         Luspatercept-aamt (REBLOZYL) injection 60 mg Admin Date  01/22/2024 Action  Given Dose  60 mg Route  SubCUTAneous Administered By  Sloane Singh RN           Patient Vitals for the past 24 hrs:   BP Temp Temp src Pulse Resp SpO2 Height Weight   01/22/24 1103 (!) 146/73 -- -- 96 17 96 % -- --   01/22/24 0847 (!) 148/77 (!) 96.5 °F (35.8 °C) Temporal (!) 137 16 97 % 1.6 m (5' 2.99\") 56.4 kg (124 lb 4.8 oz)        Pt tolerated treatment well.  Pt discharged ambulatory in no acute distress , accompanied by daughter.  Next appointment 1/23/2024.

## 2024-01-23 ENCOUNTER — HOSPITAL ENCOUNTER (OUTPATIENT)
Facility: HOSPITAL | Age: 89
Setting detail: INFUSION SERIES
Discharge: HOME | End: 2024-01-23
Payer: MEDICARE

## 2024-01-23 ENCOUNTER — CLINICAL DOCUMENTATION (OUTPATIENT)
Age: 89
End: 2024-01-23

## 2024-01-23 VITALS
RESPIRATION RATE: 16 BRPM | TEMPERATURE: 99.1 F | SYSTOLIC BLOOD PRESSURE: 135 MMHG | OXYGEN SATURATION: 100 % | HEART RATE: 90 BPM | DIASTOLIC BLOOD PRESSURE: 52 MMHG

## 2024-01-23 DIAGNOSIS — D46.4 REFRACTORY ANEMIA DUE TO MYELODYSPLASTIC SYNDROME (HCC): Primary | ICD-10-CM

## 2024-01-23 LAB
BASOPHILS # BLD: 0 K/UL (ref 0–0.1)
BASOPHILS NFR BLD: 0 % (ref 0–1)
DIFFERENTIAL METHOD BLD: ABNORMAL
EOSINOPHIL # BLD: 0 K/UL (ref 0–0.4)
EOSINOPHIL NFR BLD: 0 % (ref 0–7)
ERYTHROCYTE [DISTWIDTH] IN BLOOD BY AUTOMATED COUNT: 20.1 % (ref 11.5–14.5)
HCT VFR BLD AUTO: 23.2 % (ref 35–47)
HGB BLD-MCNC: 7.5 G/DL (ref 11.5–16)
IMM GRANULOCYTES # BLD AUTO: 0.2 K/UL (ref 0–0.04)
IMM GRANULOCYTES NFR BLD AUTO: 4 % (ref 0–0.5)
LYMPHOCYTES # BLD: 1.1 K/UL (ref 0.8–3.5)
LYMPHOCYTES NFR BLD: 24 % (ref 12–49)
MCH RBC QN AUTO: 27.1 PG (ref 26–34)
MCHC RBC AUTO-ENTMCNC: 32.3 G/DL (ref 30–36.5)
MCV RBC AUTO: 83.8 FL (ref 80–99)
MONOCYTES # BLD: 0.3 K/UL (ref 0–1)
MONOCYTES NFR BLD: 6 % (ref 5–13)
NEUTS SEG # BLD: 2.9 K/UL (ref 1.8–8)
NEUTS SEG NFR BLD: 66 % (ref 32–75)
NRBC # BLD: 0.06 K/UL (ref 0–0.01)
NRBC BLD-RTO: 1.3 PER 100 WBC
PLATELET # BLD AUTO: 144 K/UL (ref 150–400)
RBC # BLD AUTO: 2.77 M/UL (ref 3.8–5.2)
RBC MORPH BLD: ABNORMAL
WBC # BLD AUTO: 4.5 K/UL (ref 3.6–11)

## 2024-01-23 PROCEDURE — 6370000000 HC RX 637 (ALT 250 FOR IP): Performed by: NURSE PRACTITIONER

## 2024-01-23 PROCEDURE — 36415 COLL VENOUS BLD VENIPUNCTURE: CPT

## 2024-01-23 PROCEDURE — 2580000003 HC RX 258: Performed by: NURSE PRACTITIONER

## 2024-01-23 PROCEDURE — P9016 RBC LEUKOCYTES REDUCED: HCPCS

## 2024-01-23 PROCEDURE — 36430 TRANSFUSION BLD/BLD COMPNT: CPT

## 2024-01-23 PROCEDURE — 85025 COMPLETE CBC W/AUTO DIFF WBC: CPT

## 2024-01-23 RX ORDER — ACETAMINOPHEN 325 MG/1
650 TABLET ORAL ONCE
Status: COMPLETED | OUTPATIENT
Start: 2024-01-23 | End: 2024-01-23

## 2024-01-23 RX ORDER — DIPHENHYDRAMINE HCL 25 MG
25 CAPSULE ORAL ONCE
Status: COMPLETED | OUTPATIENT
Start: 2024-01-23 | End: 2024-01-23

## 2024-01-23 RX ORDER — ALBUTEROL SULFATE 90 UG/1
4 AEROSOL, METERED RESPIRATORY (INHALATION) PRN
OUTPATIENT
Start: 2024-01-23

## 2024-01-23 RX ORDER — ACETAMINOPHEN 325 MG/1
650 TABLET ORAL ONCE
Status: CANCELLED | OUTPATIENT
Start: 2024-01-23 | End: 2024-01-23

## 2024-01-23 RX ORDER — SODIUM CHLORIDE 0.9 % (FLUSH) 0.9 %
5-40 SYRINGE (ML) INJECTION PRN
Status: CANCELLED | OUTPATIENT
Start: 2024-01-23

## 2024-01-23 RX ORDER — ACETAMINOPHEN 325 MG/1
650 TABLET ORAL
OUTPATIENT
Start: 2024-01-23

## 2024-01-23 RX ORDER — DIPHENHYDRAMINE HCL 25 MG
25 CAPSULE ORAL ONCE
Status: CANCELLED | OUTPATIENT
Start: 2024-01-23 | End: 2024-01-23

## 2024-01-23 RX ORDER — EPINEPHRINE 1 MG/ML
0.3 INJECTION, SOLUTION INTRAMUSCULAR; SUBCUTANEOUS PRN
OUTPATIENT
Start: 2024-01-23

## 2024-01-23 RX ORDER — SODIUM CHLORIDE 9 MG/ML
20 INJECTION, SOLUTION INTRAVENOUS CONTINUOUS
Status: CANCELLED | OUTPATIENT
Start: 2024-01-23

## 2024-01-23 RX ORDER — DIPHENHYDRAMINE HYDROCHLORIDE 50 MG/ML
50 INJECTION INTRAMUSCULAR; INTRAVENOUS
OUTPATIENT
Start: 2024-01-23

## 2024-01-23 RX ORDER — SODIUM CHLORIDE 0.9 % (FLUSH) 0.9 %
5-40 SYRINGE (ML) INJECTION PRN
Status: DISCONTINUED | OUTPATIENT
Start: 2024-01-23 | End: 2024-01-24 | Stop reason: HOSPADM

## 2024-01-23 RX ORDER — SODIUM CHLORIDE 9 MG/ML
INJECTION, SOLUTION INTRAVENOUS CONTINUOUS
OUTPATIENT
Start: 2024-01-23

## 2024-01-23 RX ORDER — ONDANSETRON 2 MG/ML
8 INJECTION INTRAMUSCULAR; INTRAVENOUS
OUTPATIENT
Start: 2024-01-23

## 2024-01-23 RX ORDER — SODIUM CHLORIDE 9 MG/ML
20 INJECTION, SOLUTION INTRAVENOUS CONTINUOUS
Status: DISCONTINUED | OUTPATIENT
Start: 2024-01-23 | End: 2024-01-24 | Stop reason: HOSPADM

## 2024-01-23 RX ORDER — SODIUM CHLORIDE 9 MG/ML
25 INJECTION, SOLUTION INTRAVENOUS PRN
OUTPATIENT
Start: 2024-01-23

## 2024-01-23 RX ADMIN — Medication 10 ML: at 13:18

## 2024-01-23 RX ADMIN — SODIUM CHLORIDE 20 ML/HR: 9 INJECTION, SOLUTION INTRAVENOUS at 09:17

## 2024-01-23 RX ADMIN — ACETAMINOPHEN 650 MG: 325 TABLET ORAL at 09:23

## 2024-01-23 RX ADMIN — DIPHENHYDRAMINE HYDROCHLORIDE 25 MG: 25 CAPSULE ORAL at 09:23

## 2024-01-23 NOTE — PROGRESS NOTES
[facility] Progress Note  January 23, 2024    0905  Esther Kaiser arrived ambulatory and in no acute distress for 2 units PRBCs.      Patient COVID Screening completed:  Do you have any symptoms of COVID-19? SOB, coughing, fever, or generally not feeling well? NO  Have you been exposed to COVID-19 recently? NO  Have you had any recent contact with family/friend that has a pending COVID test? NO    Assessment completed, no new complaints voiced.  22G PIV established in RAC without difficulty.      Education provided regarding reason for blood transfusion and possible reactions.  All questions and concerns regarding transfusion answered, patient voiced understanding.    Problem: Safety - Adult  Goal: Free from fall injury  Outcome: Progressing  Flowsheets (Taken 1/23/2024 0909)  Free From Fall Injury: Instruct family/caregiver on patient safety     Problem: Chronic Conditions and Co-morbidities  Goal: Patient's chronic conditions and co-morbidity symptoms are monitored and maintained or improved  Outcome: Progressing     Problem: Decision Making  Goal: Pt/Family able to effectively weigh alternatives and participate in decision making related to treatment and care  Description: INTERVENTIONS:  1. Determine when there are differences between patient's view, family's view, and healthcare provider's view of condition  2. Facilitate patient and family articulation of goals for care  3. Help patient and family identify pros/cons of alternative solutions  4. Provide information as requested by patient/family  5. Respect patient/family right to receive or not to receive information  6. Serve as a liaison between patient and family and health care team  7. Initiate Consults from Ethics, Palliative Care or initiate Family Care Conference as is appropriate  Outcome: Progressing    Medications received:  NS @ KVO  Tylenol 650 mg PO  Benadryl 25 mg PO    0945:  1st unit PRBCs started and infusing without difficulty, observed x 15  minutes  1209:  1st unit completed without adverse reaction noted, NS flushing line.  HGB checked between units per protocol. Hgb 7.4. Patient will not get 2nd unit.    Patient Vitals for the past 12 hrs:   Temp Pulse Resp BP SpO2   01/23/24 0939 99.4 °F (37.4 °C) 70 16 (!) 127/49 100 %     1320 Patient tolerated transfusion  well, no adverse reaction noted.  D/C instructions reviewed, copy to pt, voiced understanding. PIV flushed and removed.  Patient declined 1 hour post transfusion observation/vitals signs.  D/Cd from [facility] ambulatory and in no distress accompanied by self.      Future Appointments   Date Time Provider Department Center   1/29/2024  4:00 PM VALENTIN FASTRACK 6 [facility]   2/5/2024  4:00 PM [facility]IN FASTRACK 6 [facility]   2/9/2024  9:30 AM VALENTIN LONG3 [place] [facility]   2/9/2024  9:45 AM Zander Elena MD [facility]   2/12/2024  9:30 AM [facility]IN FASTRACK 3 [facility]   2/19/2024  8:30 AM [facility]IN FASTRACK 4 [facility]   2/26/2024  9:30 AM VALENTIN FASTRACK 4 [facility]   3/1/2024  9:30 AM VALENTIN LONG1 [place] [facility]   3/4/2024  9:30 AM VALENTIN FASTRACK 7 [facility]   3/11/2024 10:00 AM VALENTIN FASTRACK 2 [facility]   3/18/2024 10:00 AM VALENTIN FASTRACK 2 [facility]   3/22/2024  9:30 AM VALENTIN LONG3 [place] [facility]   4/12/2024  9:30 AM VALENTIN LONG3 [place] [facility]   5/3/2024  9:30 AM VALENTIN MED4 [facility]     Nisha Gay RN  January 23, 2024

## 2024-01-23 NOTE — PROGRESS NOTES
I have discussed with the patient the rationale for blood component transfusion; its benefits in treating or preventing fatigue, organ damage, or death; and its risk which includes mild transfusion reactions, rare risk of blood borne infection, or more serious but rare reactions. I have discussed the alternatives to transfusion, including the risk and consequences of not receiving transfusion. The patient had an opportunity to ask questions and had agreed to proceed with transfusion of blood components.

## 2024-01-23 NOTE — DISCHARGE INSTRUCTIONS
Learning About Blood Transfusions  What is a blood transfusion?     Blood transfusion is a medical treatment to replace the blood or parts of blood that your body has lost. The blood goes through a tube from a bag to an intravenous (I.V.) catheter and into your vein.  You may need a blood transfusion after losing blood from an injury, a major surgery, an illness that causes bleeding, or an illness that destroys blood cells.  Transfusions are also used to give you the parts of blood--such as platelets, plasma, or substances that cause clotting--that your body needs to fight an illness or stop bleeding.  How is a blood transfusion done?  Before you receive a blood transfusion, your blood is tested to find out what your blood type is. Blood or blood parts that are a match with your blood type are ordered by your doctor. Blood is typed as A, B, AB, or O. It is also typed as Rh-positive or Rh-negative.  Your blood is also screened to look for antibodies that might react with the blood that is given to you. The blood you are getting is checked and rechecked to make sure that it's the right type for you.  A sample of your blood is mixed with a sample of the blood you will receive to check for problems. Before actually giving you the transfusion, a doctor and nurses will look at the label on the package of blood and compare it to your hospital ID bracelet and medical records. The transfusion begins only when all agree that this is the correct blood and that you are the correct person to receive it.  To receive the transfusion, you will have an intravenous (I.V.) catheter inserted into a vein. A tube connects the catheter to the bag containing the blood, which is placed higher than your body. The blood then flows slowly into your vein. A doctor or nurse will check you several times during the transfusion to watch for a reaction or other problems.  What are the possible risks?  Blood transfusions have many benefits and

## 2024-01-24 LAB
ABO + RH BLD: NORMAL
BLD PROD TYP BPU: NORMAL
BLOOD BANK DISPENSE STATUS: NORMAL
BLOOD GROUP ANTIBODIES SERPL: NORMAL
BPU ID: NORMAL
CROSSMATCH RESULT: NORMAL
SPECIMEN EXP DATE BLD: NORMAL
UNIT DIVISION: 0

## 2024-01-26 LAB
CELLS ANALYZED: 200
CELLS COUNTED: 200
DIRECTOR REVIEW: NORMAL
FISH RESULT: NORMAL
INTERPRETATION: NORMAL
Lab: NORMAL

## 2024-01-29 ENCOUNTER — HOSPITAL ENCOUNTER (OUTPATIENT)
Facility: HOSPITAL | Age: 89
Setting detail: INFUSION SERIES
Discharge: HOME | End: 2024-01-29
Payer: MEDICARE

## 2024-01-29 DIAGNOSIS — D46.4 REFRACTORY ANEMIA DUE TO MYELODYSPLASTIC SYNDROME (HCC): ICD-10-CM

## 2024-01-29 DIAGNOSIS — D46.9 MDS (MYELODYSPLASTIC SYNDROME) (HCC): ICD-10-CM

## 2024-01-29 LAB
BASOPHILS # BLD: 0 K/UL (ref 0–0.1)
BASOPHILS NFR BLD: 0 % (ref 0–1)
DIFFERENTIAL METHOD BLD: ABNORMAL
DIRECTOR REVIEW: 489204: NORMAL
DIRECTOR REVIEW: NORMAL
EOSINOPHIL # BLD: 0 K/UL (ref 0–0.4)
EOSINOPHIL NFR BLD: 0 % (ref 0–7)
ERYTHROCYTE [DISTWIDTH] IN BLOOD BY AUTOMATED COUNT: 20.3 % (ref 11.5–14.5)
HCT VFR BLD AUTO: 25.1 % (ref 35–47)
HGB BLD-MCNC: 7.7 G/DL (ref 11.5–16)
IMM GRANULOCYTES # BLD AUTO: 0 K/UL (ref 0–0.04)
IMM GRANULOCYTES NFR BLD AUTO: 0 % (ref 0–0.5)
JAK2 P.V617F BLD/T QL: NORMAL
JAK2 V617F %: 31.09 %
JAK2 V617F RESULT: NORMAL
LABORATORY COMMENT REPORT: NORMAL
LYMPHOCYTES # BLD: 0.8 K/UL (ref 0.8–3.5)
LYMPHOCYTES NFR BLD: 16 % (ref 12–49)
Lab: NORMAL
Lab: NORMAL
MCH RBC QN AUTO: 26.7 PG (ref 26–34)
MCHC RBC AUTO-ENTMCNC: 30.7 G/DL (ref 30–36.5)
MCV RBC AUTO: 87.2 FL (ref 80–99)
MONOCYTES # BLD: 0.3 K/UL (ref 0–1)
MONOCYTES NFR BLD: 6 % (ref 5–13)
NEUTS BAND NFR BLD MANUAL: 1 %
NEUTS SEG # BLD: 3.6 K/UL (ref 1.8–8)
NEUTS SEG NFR BLD: 77 % (ref 32–75)
NRBC # BLD: 0.08 K/UL (ref 0–0.01)
NRBC BLD-RTO: 1.7 PER 100 WBC
PLATELET # BLD AUTO: 125 K/UL (ref 150–400)
RBC # BLD AUTO: 2.88 M/UL (ref 3.8–5.2)
RBC MORPH BLD: ABNORMAL
RBC MORPH BLD: ABNORMAL
REFLEX: NORMAL
V617F REFLES CALR/MPL BACKGROUND: NORMAL
WBC # BLD AUTO: 4.7 K/UL (ref 3.6–11)

## 2024-01-29 PROCEDURE — 36415 COLL VENOUS BLD VENIPUNCTURE: CPT

## 2024-01-29 PROCEDURE — 85025 COMPLETE CBC W/AUTO DIFF WBC: CPT

## 2024-01-29 NOTE — PROGRESS NOTES
[facility] Lab Draw Note:  Arrived - 1600    Labs drawn peripherally from right arm - CBC and SBB      1610 - Tolerated well.  Pt denies any acute problems/changes. Discharged from [facility] ambulatory. No distress. Next appt: 2/5/24       See [facility] for pending lab results.

## 2024-02-05 ENCOUNTER — HOSPITAL ENCOUNTER (OUTPATIENT)
Facility: HOSPITAL | Age: 89
Setting detail: INFUSION SERIES
End: 2024-02-05

## 2024-02-06 ENCOUNTER — NURSE ONLY (OUTPATIENT)
Age: 89
End: 2024-02-06

## 2024-02-06 ENCOUNTER — TELEPHONE (OUTPATIENT)
Age: 89
End: 2024-02-06

## 2024-02-06 ENCOUNTER — HOSPITAL ENCOUNTER (OUTPATIENT)
Facility: HOSPITAL | Age: 89
Setting detail: OBSERVATION
Discharge: HOME | End: 2024-02-07
Attending: EMERGENCY MEDICINE | Admitting: INTERNAL MEDICINE
Payer: MEDICARE

## 2024-02-06 DIAGNOSIS — D46.4 REFRACTORY ANEMIA DUE TO MYELODYSPLASTIC SYNDROME (HCC): ICD-10-CM

## 2024-02-06 DIAGNOSIS — D46.9 MDS (MYELODYSPLASTIC SYNDROME) (HCC): ICD-10-CM

## 2024-02-06 DIAGNOSIS — D60.0 CHRONIC ACQUIRED PURE RED CELL APLASIA (HCC): ICD-10-CM

## 2024-02-06 DIAGNOSIS — D47.1 MYELOPROLIFERATIVE DISEASE (HCC): Primary | ICD-10-CM

## 2024-02-06 PROBLEM — D64.9 TRANSFUSION-DEPENDENT ANEMIA: Status: ACTIVE | Noted: 2024-02-06

## 2024-02-06 LAB
ALBUMIN SERPL-MCNC: 3 G/DL (ref 3.5–5)
ALBUMIN/GLOB SERPL: 0.8 (ref 1.1–2.2)
ALP SERPL-CCNC: 90 U/L (ref 45–117)
ALT SERPL-CCNC: 15 U/L (ref 12–78)
ANION GAP SERPL CALC-SCNC: 5 MMOL/L (ref 5–15)
APPEARANCE UR: CLEAR
AST SERPL-CCNC: 7 U/L (ref 15–37)
BACTERIA URNS QL MICRO: NEGATIVE /HPF
BASOPHILS # BLD: 0 K/UL (ref 0–0.1)
BASOPHILS NFR BLD: 0 % (ref 0–1)
BILIRUB SERPL-MCNC: 0.8 MG/DL (ref 0.2–1)
BILIRUB UR QL: NEGATIVE
BUN SERPL-MCNC: 16 MG/DL (ref 6–20)
BUN/CREAT SERPL: 18 (ref 12–20)
CALCIUM SERPL-MCNC: 8.1 MG/DL (ref 8.5–10.1)
CHLORIDE SERPL-SCNC: 111 MMOL/L (ref 97–108)
CO2 SERPL-SCNC: 27 MMOL/L (ref 21–32)
COLOR UR: NORMAL
COMMENT:: NORMAL
CREAT SERPL-MCNC: 0.87 MG/DL (ref 0.55–1.02)
DIFFERENTIAL METHOD BLD: ABNORMAL
EOSINOPHIL # BLD: 0 K/UL (ref 0–0.4)
EOSINOPHIL NFR BLD: 0 % (ref 0–7)
EPITH CASTS URNS QL MICRO: NORMAL /LPF
ERYTHROCYTE [DISTWIDTH] IN BLOOD BY AUTOMATED COUNT: 20 % (ref 11.5–14.5)
ERYTHROCYTE [DISTWIDTH] IN BLOOD BY AUTOMATED COUNT: 20.2 % (ref 11.5–14.5)
GLOBULIN SER CALC-MCNC: 3.6 G/DL (ref 2–4)
GLUCOSE SERPL-MCNC: 93 MG/DL (ref 65–100)
GLUCOSE UR STRIP.AUTO-MCNC: NEGATIVE MG/DL
HCT VFR BLD AUTO: 20.8 % (ref 35–47)
HCT VFR BLD AUTO: 23.6 % (ref 35–47)
HGB BLD-MCNC: 6.3 G/DL (ref 11.5–16)
HGB BLD-MCNC: 7.2 G/DL (ref 11.5–16)
HGB UR QL STRIP: NEGATIVE
HISTORY CHECK: NORMAL
HYALINE CASTS URNS QL MICRO: NORMAL /LPF (ref 0–2)
IMM GRANULOCYTES # BLD AUTO: 0 K/UL
IMM GRANULOCYTES NFR BLD AUTO: 0 %
KETONES UR QL STRIP.AUTO: NEGATIVE MG/DL
LEUKOCYTE ESTERASE UR QL STRIP.AUTO: NEGATIVE
LYMPHOCYTES # BLD: 1.4 K/UL (ref 0.8–3.5)
LYMPHOCYTES NFR BLD: 37 % (ref 12–49)
MCH RBC QN AUTO: 26.8 PG (ref 26–34)
MCH RBC QN AUTO: 27 PG (ref 26–34)
MCHC RBC AUTO-ENTMCNC: 30.3 G/DL (ref 30–36.5)
MCHC RBC AUTO-ENTMCNC: 30.5 G/DL (ref 30–36.5)
MCV RBC AUTO: 88.4 FL (ref 80–99)
MCV RBC AUTO: 88.5 FL (ref 80–99)
MONOCYTES # BLD: 0.2 K/UL (ref 0–1)
MONOCYTES NFR BLD: 6 % (ref 5–13)
NEUTS BAND NFR BLD MANUAL: 2 % (ref 0–6)
NEUTS SEG # BLD: 2.2 K/UL (ref 1.8–8)
NEUTS SEG NFR BLD: 55 % (ref 32–75)
NITRITE UR QL STRIP.AUTO: NEGATIVE
NRBC # BLD: 0.08 K/UL (ref 0–0.01)
NRBC # BLD: 0.11 K/UL (ref 0–0.01)
NRBC BLD-RTO: 2.3 PER 100 WBC
NRBC BLD-RTO: 2.9 PER 100 WBC
PH UR STRIP: 7.5 (ref 5–8)
PLATELET # BLD AUTO: 84 K/UL (ref 150–400)
PLATELET # BLD AUTO: 93 K/UL (ref 150–400)
POTASSIUM SERPL-SCNC: 4.8 MMOL/L (ref 3.5–5.1)
PROT SERPL-MCNC: 6.6 G/DL (ref 6.4–8.2)
PROT UR STRIP-MCNC: NEGATIVE MG/DL
RBC # BLD AUTO: 2.35 M/UL (ref 3.8–5.2)
RBC # BLD AUTO: 2.67 M/UL (ref 3.8–5.2)
RBC #/AREA URNS HPF: NORMAL /HPF (ref 0–5)
RBC MORPH BLD: ABNORMAL
RBC MORPH BLD: ABNORMAL
SODIUM SERPL-SCNC: 143 MMOL/L (ref 136–145)
SP GR UR REFRACTOMETRY: 1.01
SPECIMEN HOLD: NORMAL
URINE CULTURE IF INDICATED: NORMAL
UROBILINOGEN UR QL STRIP.AUTO: 1 EU/DL (ref 0.2–1)
WBC # BLD AUTO: 3.4 K/UL (ref 3.6–11)
WBC # BLD AUTO: 3.8 K/UL (ref 3.6–11)
WBC MORPH BLD: ABNORMAL
WBC URNS QL MICRO: NORMAL /HPF (ref 0–4)

## 2024-02-06 PROCEDURE — P9016 RBC LEUKOCYTES REDUCED: HCPCS

## 2024-02-06 PROCEDURE — 85027 COMPLETE CBC AUTOMATED: CPT

## 2024-02-06 PROCEDURE — 2580000003 HC RX 258: Performed by: INTERNAL MEDICINE

## 2024-02-06 PROCEDURE — G0378 HOSPITAL OBSERVATION PER HR: HCPCS

## 2024-02-06 PROCEDURE — 86923 COMPATIBILITY TEST ELECTRIC: CPT

## 2024-02-06 PROCEDURE — 80053 COMPREHEN METABOLIC PANEL: CPT

## 2024-02-06 PROCEDURE — 99285 EMERGENCY DEPT VISIT HI MDM: CPT

## 2024-02-06 PROCEDURE — 2580000003 HC RX 258: Performed by: EMERGENCY MEDICINE

## 2024-02-06 PROCEDURE — 86850 RBC ANTIBODY SCREEN: CPT

## 2024-02-06 PROCEDURE — 36430 TRANSFUSION BLD/BLD COMPNT: CPT

## 2024-02-06 PROCEDURE — 86901 BLOOD TYPING SEROLOGIC RH(D): CPT

## 2024-02-06 PROCEDURE — 6370000000 HC RX 637 (ALT 250 FOR IP): Performed by: INTERNAL MEDICINE

## 2024-02-06 PROCEDURE — 36415 COLL VENOUS BLD VENIPUNCTURE: CPT

## 2024-02-06 PROCEDURE — P9040 RBC LEUKOREDUCED IRRADIATED: HCPCS

## 2024-02-06 PROCEDURE — 81001 URINALYSIS AUTO W/SCOPE: CPT

## 2024-02-06 PROCEDURE — 86900 BLOOD TYPING SEROLOGIC ABO: CPT

## 2024-02-06 RX ORDER — ATORVASTATIN CALCIUM 10 MG/1
10 TABLET, FILM COATED ORAL NIGHTLY
Status: DISCONTINUED | OUTPATIENT
Start: 2024-02-06 | End: 2024-02-07 | Stop reason: HOSPADM

## 2024-02-06 RX ORDER — LATANOPROST 50 UG/ML
1 SOLUTION/ DROPS OPHTHALMIC NIGHTLY
Status: DISCONTINUED | OUTPATIENT
Start: 2024-02-06 | End: 2024-02-07 | Stop reason: HOSPADM

## 2024-02-06 RX ORDER — 0.9 % SODIUM CHLORIDE 0.9 %
1000 INTRAVENOUS SOLUTION INTRAVENOUS
Status: COMPLETED | OUTPATIENT
Start: 2024-02-06 | End: 2024-02-07

## 2024-02-06 RX ORDER — POLYETHYLENE GLYCOL 3350 17 G/17G
17 POWDER, FOR SOLUTION ORAL DAILY PRN
Status: DISCONTINUED | OUTPATIENT
Start: 2024-02-06 | End: 2024-02-07 | Stop reason: HOSPADM

## 2024-02-06 RX ORDER — SODIUM CHLORIDE 0.9 % (FLUSH) 0.9 %
5-40 SYRINGE (ML) INJECTION PRN
Status: DISCONTINUED | OUTPATIENT
Start: 2024-02-06 | End: 2024-02-07 | Stop reason: HOSPADM

## 2024-02-06 RX ORDER — ACETAMINOPHEN 325 MG/1
650 TABLET ORAL EVERY 6 HOURS PRN
Status: DISCONTINUED | OUTPATIENT
Start: 2024-02-06 | End: 2024-02-07 | Stop reason: HOSPADM

## 2024-02-06 RX ORDER — ATORVASTATIN CALCIUM 10 MG/1
10 TABLET, FILM COATED ORAL DAILY
Status: DISCONTINUED | OUTPATIENT
Start: 2024-02-06 | End: 2024-02-06

## 2024-02-06 RX ORDER — ONDANSETRON 2 MG/ML
4 INJECTION INTRAMUSCULAR; INTRAVENOUS EVERY 6 HOURS PRN
Status: DISCONTINUED | OUTPATIENT
Start: 2024-02-06 | End: 2024-02-07 | Stop reason: HOSPADM

## 2024-02-06 RX ORDER — SODIUM CHLORIDE 0.9 % (FLUSH) 0.9 %
5-40 SYRINGE (ML) INJECTION EVERY 12 HOURS SCHEDULED
Status: DISCONTINUED | OUTPATIENT
Start: 2024-02-06 | End: 2024-02-07 | Stop reason: HOSPADM

## 2024-02-06 RX ORDER — MONTELUKAST SODIUM 10 MG/1
10 TABLET ORAL NIGHTLY
Status: DISCONTINUED | OUTPATIENT
Start: 2024-02-06 | End: 2024-02-07 | Stop reason: HOSPADM

## 2024-02-06 RX ORDER — MONTELUKAST SODIUM 10 MG/1
10 TABLET ORAL NIGHTLY
COMMUNITY
Start: 2023-08-05

## 2024-02-06 RX ORDER — SODIUM CHLORIDE 9 MG/ML
INJECTION, SOLUTION INTRAVENOUS PRN
Status: DISCONTINUED | OUTPATIENT
Start: 2024-02-06 | End: 2024-02-07 | Stop reason: HOSPADM

## 2024-02-06 RX ORDER — PANTOPRAZOLE SODIUM 40 MG/1
40 TABLET, DELAYED RELEASE ORAL 2 TIMES DAILY
Status: DISCONTINUED | OUTPATIENT
Start: 2024-02-06 | End: 2024-02-07 | Stop reason: HOSPADM

## 2024-02-06 RX ORDER — ONDANSETRON 4 MG/1
4 TABLET, ORALLY DISINTEGRATING ORAL EVERY 8 HOURS PRN
Status: DISCONTINUED | OUTPATIENT
Start: 2024-02-06 | End: 2024-02-07 | Stop reason: HOSPADM

## 2024-02-06 RX ORDER — ACETAMINOPHEN 650 MG/1
650 SUPPOSITORY RECTAL EVERY 6 HOURS PRN
Status: DISCONTINUED | OUTPATIENT
Start: 2024-02-06 | End: 2024-02-07 | Stop reason: HOSPADM

## 2024-02-06 RX ADMIN — SODIUM CHLORIDE, PRESERVATIVE FREE 10 ML: 5 INJECTION INTRAVENOUS at 20:37

## 2024-02-06 RX ADMIN — MONTELUKAST 10 MG: 10 TABLET, FILM COATED ORAL at 20:31

## 2024-02-06 RX ADMIN — SODIUM CHLORIDE 1000 ML: 9 INJECTION, SOLUTION INTRAVENOUS at 13:15

## 2024-02-06 RX ADMIN — ATORVASTATIN CALCIUM 10 MG: 10 TABLET, FILM COATED ORAL at 20:31

## 2024-02-06 RX ADMIN — LATANOPROST 1 DROP: 50 SOLUTION OPHTHALMIC at 22:49

## 2024-02-06 RX ADMIN — PANTOPRAZOLE SODIUM 40 MG: 40 TABLET, DELAYED RELEASE ORAL at 20:36

## 2024-02-06 ASSESSMENT — ENCOUNTER SYMPTOMS: SHORTNESS OF BREATH: 1

## 2024-02-06 NOTE — ED NOTES
RN spoke with Hospitalist at bedside. Hospitalist verbalized there was no need to repeat blood work in between transfusions, to repeat blood work with AM labs.

## 2024-02-06 NOTE — ED PROVIDER NOTES
SINGULAIR     pantoprazole 40 MG tablet  Commonly known as: PROTONIX  Take 1 tablet by mouth in the morning and at bedtime     Travoprost (MAGY Free) 0.004 % Soln ophthalmic solution  Commonly known as: TRAVATAN Z               Follow up:  No follow-up provider specified.     Disclaimers   I was the first provider for this patient on this visit.  To the best of my ability I reviewed relevant prior medical records, electrocardiograms, laboratories, and radiologic studies.    The patient's presenting problems were discussed, and the patient was in agreement with the care plan formulated and outlined with them.     Please note that this dictation was completed with Dragon voice recognition software. Quite often unanticipated grammatical, syntax, homophones, and other interpretive errors are inadvertently transcribed by the computer software.   Please disregard these errors and excuse any errors that have escaped final proofreading.    Macario Evangelista MD    I personally performed the services described in this documentation on this date 2/6/24  for patient Esther Kaiser.      Macario Evangelista MD  7:56 PM         Macario Evangelista MD  02/06/24 1957

## 2024-02-06 NOTE — TELEPHONE ENCOUNTER
Pt daughter returned call.  She is en route to the ER at [facility].  Pt can barely walk and she thinks she is dehydrated on top of this.  Pt PCP is also out of the office today.  She understands ER would be best place to take pt as to outpatient services would not be able to give her blood today and she may need additional work up as well.

## 2024-02-06 NOTE — TELEPHONE ENCOUNTER
Pt uzair called stating her mom has been not doing good. She can barley walk and all she wants to do is sleep. Daughter is wondering should she take her mom to the hospital or does she need to come see

## 2024-02-06 NOTE — H&P
Height Weight   24 1145 138/62 97.8 °F (36.6 °C) Oral 63 20 95 % -- --   24 1047 (!) 118/56 97.5 °F (36.4 °C) -- 74 -- 100 % -- --   24 1044 -- -- -- -- 18 -- 1.6 m (5' 3\") 56.7 kg (125 lb)       Temp (24hrs), Av.7 °F (36.5 °C), Min:97.5 °F (36.4 °C), Max:97.8 °F (36.6 °C)             Wt Readings from Last 12 Encounters:   24 56.7 kg (125 lb)   24 56.4 kg (124 lb 4.8 oz)   24 56.4 kg (124 lb 5.4 oz)   24 58 kg (127 lb 12.8 oz)   23 56.7 kg (125 lb)   23 57 kg (125 lb 9.6 oz)   23 56.5 kg (124 lb 9.6 oz)         PHYSICAL EXAM:  General:    Alert, cooperative, appears stated age.  Conjunctival pallor noted+  Lungs:   CTA b/l.  No wheezing or Rhonchi. No rales.  Chest wall:  No tenderness.  No accessory muscle use.  Heart:   Regular  rhythm,  No  Murmur.   No edema  Abdomen:   Soft, NT. ND  BS+  Extremities: No cyanosis.  No clubbing,      Skin turgor normal, Radial dial pulse 2+. Capillary refill normal  Neurologic: No facial asymmetry. No aphasia or slurred speech. Symmetrical strength, Sensation grossly intact. AAOx4.         LAB DATA REVIEWED:    Recent Results (from the past 12 hour(s))   TYPE AND SCREEN    Collection Time: 24 12:14 PM   Result Value Ref Range    Crossmatch expiration date 2024,8351     ABO/Rh B POSITIVE     Antibody Screen NEG     Unit Number [id number]     Product Code Blood Bank  LRIR     Unit Divison 00     Dispense Status Blood Bank ALLOCATED     Crossmatch Result Compatible     Unit Number [id number]     Product Code Blood Bank  LR     Unit Divison 00     Dispense Status Blood Bank ALLOCATED     Crossmatch Result Compatible    CBC    Collection Time: 24 12:15 PM   Result Value Ref Range    WBC 3.4 (L) 3.6 - 11.0 K/uL    RBC 2.35 (L) 3.80 - 5.20 M/uL    Hemoglobin 6.3 (L) 11.5 - 16.0 g/dL    Hematocrit 20.8 (L) 35.0 - 47.0 %    MCV 88.5 80.0 - 99.0 FL    MCH 26.8 26.0 - 34.0 PG    MCHC 30.3 30.0 - 36.5

## 2024-02-06 NOTE — CONSENT
Informed Consent for Blood Component Transfusion Note    I have discussed with the patient the rationale for blood component transfusion; its benefits in treating or preventing fatigue, organ damage, or death; and its risk which includes mild transfusion reactions, rare risk of blood borne infection, or more serious but rare reactions. I have discussed the alternatives to transfusion, including the risk and consequences of not receiving transfusion. The patient had an opportunity to ask questions and had agreed to proceed with transfusion of blood components.    Electronically signed by SAPPHIRE SERRANO MD on 2/6/24 at 3:01 PM EST

## 2024-02-06 NOTE — TELEPHONE ENCOUNTER
Returned call to Verito.  No answer.  Left detailed VM on her self identified line.  Advised if pt is having shortness of breath or chest pain she should go to the ER as we would not be able to give her a blood transfusion today.  Wanting to sleep all the time could be related to low hgb. I am not sure about the not being walk, need further information.  Awaiting a call back.

## 2024-02-06 NOTE — ED NOTES
RN went into pt room to start IV and obtain blood work due to orders still shown as not collected. Pt told RN they were collected 45 minutes ago by ED tech. RN called Lab to see if labs were received. Lab stated they did not have lab tubes.

## 2024-02-07 VITALS
RESPIRATION RATE: 17 BRPM | SYSTOLIC BLOOD PRESSURE: 106 MMHG | OXYGEN SATURATION: 97 % | HEIGHT: 63 IN | WEIGHT: 125 LBS | HEART RATE: 69 BPM | BODY MASS INDEX: 22.15 KG/M2 | TEMPERATURE: 99 F | DIASTOLIC BLOOD PRESSURE: 65 MMHG

## 2024-02-07 LAB
ABO + RH BLD: NORMAL
BASOPHILS # BLD: 0 K/UL (ref 0–0.1)
BASOPHILS NFR BLD: 0 % (ref 0–1)
BLD PROD TYP BPU: NORMAL
BLD PROD TYP BPU: NORMAL
BLOOD BANK DISPENSE STATUS: NORMAL
BLOOD BANK DISPENSE STATUS: NORMAL
BLOOD GROUP ANTIBODIES SERPL: NORMAL
BPU ID: NORMAL
BPU ID: NORMAL
CROSSMATCH RESULT: NORMAL
CROSSMATCH RESULT: NORMAL
DIFFERENTIAL METHOD BLD: ABNORMAL
EOSINOPHIL # BLD: 0 K/UL (ref 0–0.4)
EOSINOPHIL NFR BLD: 0 % (ref 0–7)
ERYTHROCYTE [DISTWIDTH] IN BLOOD BY AUTOMATED COUNT: 17.4 % (ref 11.5–14.5)
GLUCOSE BLD STRIP.AUTO-MCNC: 141 MG/DL (ref 65–117)
HCT VFR BLD AUTO: 30.3 % (ref 35–47)
HGB BLD-MCNC: 9.7 G/DL (ref 11.5–16)
IMM GRANULOCYTES # BLD AUTO: 0 K/UL (ref 0–0.04)
IMM GRANULOCYTES NFR BLD AUTO: 0 % (ref 0–0.5)
LYMPHOCYTES # BLD: 1.2 K/UL (ref 0.8–3.5)
LYMPHOCYTES NFR BLD: 23 % (ref 12–49)
MCH RBC QN AUTO: 28 PG (ref 26–34)
MCHC RBC AUTO-ENTMCNC: 32 G/DL (ref 30–36.5)
MCV RBC AUTO: 87.6 FL (ref 80–99)
MONOCYTES # BLD: 0.1 K/UL (ref 0–1)
MONOCYTES NFR BLD: 1 % (ref 5–13)
NEUTS BAND NFR BLD MANUAL: 4 %
NEUTS SEG # BLD: 4.1 K/UL (ref 1.8–8)
NEUTS SEG NFR BLD: 72 % (ref 32–75)
NRBC # BLD: 0.14 K/UL (ref 0–0.01)
NRBC BLD-RTO: 2.6 PER 100 WBC
PLATELET # BLD AUTO: 73 K/UL (ref 150–400)
RBC # BLD AUTO: 3.46 M/UL (ref 3.8–5.2)
RBC MORPH BLD: ABNORMAL
RBC MORPH BLD: ABNORMAL
SERVICE CMNT-IMP: ABNORMAL
SPECIMEN EXP DATE BLD: NORMAL
UNIT DIVISION: 0
UNIT DIVISION: 0
WBC # BLD AUTO: 5.4 K/UL (ref 3.6–11)
WBC MORPH BLD: ABNORMAL

## 2024-02-07 PROCEDURE — 82962 GLUCOSE BLOOD TEST: CPT

## 2024-02-07 PROCEDURE — 85025 COMPLETE CBC W/AUTO DIFF WBC: CPT

## 2024-02-07 PROCEDURE — 36415 COLL VENOUS BLD VENIPUNCTURE: CPT

## 2024-02-07 PROCEDURE — G0378 HOSPITAL OBSERVATION PER HR: HCPCS

## 2024-02-07 PROCEDURE — 6370000000 HC RX 637 (ALT 250 FOR IP): Performed by: INTERNAL MEDICINE

## 2024-02-07 PROCEDURE — 2580000003 HC RX 258: Performed by: INTERNAL MEDICINE

## 2024-02-07 RX ADMIN — PANTOPRAZOLE SODIUM 40 MG: 40 TABLET, DELAYED RELEASE ORAL at 08:47

## 2024-02-07 RX ADMIN — SODIUM CHLORIDE, PRESERVATIVE FREE 10 ML: 5 INJECTION INTRAVENOUS at 08:50

## 2024-02-07 NOTE — PLAN OF CARE
Problem: Safety - Adult  Goal: Free from fall injury  Outcome: Progressing     Problem: ABCDS Injury Assessment  Goal: Absence of physical injury  Outcome: Progressing   Started blood transfusion vs stables patient stated is not any pain or discomfort at this moment.

## 2024-02-07 NOTE — DISCHARGE SUMMARY
Discharge Summary    Name: sEther Kaiser  [id number]  YOB: 1935 (Age: 88 y.o.)   Date of Admission: 2/6/2024  Date of Discharge: 2/7/2024  Attending Physician: Kelsey Kee MD    Discharge Diagnosis:     Symptomatic anemia POA  History of myeloproliferative disorder/MDS (transfusion dependent )-status post recent bone marrow biopsy  Suspected UTI POA-patient complaining of dysuria/foul-smelling urine  Hyperlipidemia  Seasonal allergy/asthma  Glaucoma    Consultations:  IP CONSULT TO HOSPITALIST      Brief Admission History/Reason for Admission Per Jorje Sheikh MD:     Esther Kaiser is a 88 y.o.  female with PMHx significant for myelodysplastic syndrome/myeloproliferative disorder transfusion dependent managed by hematologist Dr. Ulices Fermin as outpatient after recent diagnosis after bone marrow biopsy.  Patient had last transfusion of 1 PRBC 1 week ago as ordered by Dr. Ulices Fermin at outpatient infusion center -as per the patient/family 2 units were ordered but only 1 was given as hemoglobin after 1 unit was 7.1.  Patient apparently started feeling symptoms of anemia within 2 to 3 days and now unable to reach her primary hematologist or PCP so returns to the ED for blood transfusion.  Denies dysuria symptoms and wants UA checked to rule out UTI.  We were asked to admit for work up and evaluation of the above problems.    Brief Hospital Course by Main Problems:     Symptomatic anemia POA  History of myeloproliferative disorder/MDS (transfusion dependent )-status post recent bone marrow biopsy  Suspected UTI POA-patient complaining of dysuria/foul-smelling urine  Hyperlipidemia  Seasonal allergy/asthma  Glaucoma  Hemoglobin of 6.3 on admission  UA negative     S/p  PRBCs 2 units   9.7 hemoglobin in a.m.  Outpatient follow-up with Dr. Ulices Fermin for added support and shots and future transfusion at [facility] as needed  Continue home Lipitor  Continue home

## 2024-02-07 NOTE — DISCHARGE INSTRUCTIONS
All of your medications from before your hospitalization are the same EXCEPT:  STOP taking aspirin (looks like you already did).    Please take all of your medications as prescribed. If prescribed any medications, please read all pharmacy instructions and inserts. Inform your doctor and pharmacist about all other medications and alternative therapies.  Please follow up with your PCP in 1-2 weeks to be reassessed after your hospital stay.  Please also follow up with hematology.  If you start feeling any symptoms similar to what brought you into the hospital, please come back to the ED to be re-evaluated.

## 2024-02-07 NOTE — PROGRESS NOTES
End of Shift Note    Bedside shift change report given to DEREK Fitzpatrick (oncoming nurse) by Lucy Santana RN (offgoing nurse).  Report included the following information SBAR, Kardex, MAR, and Recent Results    Shift worked:  7 am to 7:30 pm     Shift summary and any significant changes:     Patient admitted from ED at 1815. Admission assessment and vitals completed. Patient's first unit of blood was hanging upon transfer to unit; Completed at 1849 and vitals obtained. Night nurse to hang second unit of blood. Patient denied pain and nausea. Patient 1 assist with cane to bathroom; patient became dizzy and short of breath with ambulation therefore encouraged to use bedside commode. Urinalysis/urine culture collected. Dual skin completed with night nurse. IV flushed and patent. Patient's daughter at bedside. Nursing rounds and education completed.     Concerns for physician to address: Patient was wondering if she would be discharged tomorrow 2/7 pending Hgb level.     Zone phone for oncoming shift:          Activity:     Number times ambulated in hallways past shift: 0  Number of times OOB to chair past shift: 0    Cardiac:   Cardiac Monitoring: No           Access:  Current line(s): PIV     Genitourinary:   Urinary status: voiding    Respiratory:      Chronic home O2 use?: NO  Incentive spirometer at bedside: NO       GI:     Current diet:  ADULT DIET; Regular  Passing flatus: YES  Tolerating current diet: YES       Pain Management:   Patient states pain is manageable on current regimen: YES    Skin:     Interventions: float heels and increase time out of bed    Patient Safety:  Fall Score:    Interventions: assistive device (walker, cane. etc), gripper socks, and pt to call before getting OOB       Length of Stay:  Expected LOS: 1  Actual LOS: 0      Lucy Santana RN

## 2024-02-07 NOTE — PROGRESS NOTES
Esther Kaiser is a 88 y.o. female here for follow up for anemia.  Pt feeling weak and dizzy today. She has not felt well since last transfusion with us.     1. Have you been to the ER, urgent care clinic since your last visit?  Hospitalized since your last visit?   2/6/24 - 2/7/24 symptomatic anemia     2. Have you seen or consulted any other health care providers outside of the [facility] since your last visit?  Include any pap smears or colon screening no

## 2024-02-07 NOTE — PROGRESS NOTES
.I have reviewed discharge instructions with the patient. The patient verbalized understanding. Discharge medications reviewed with patient and appropriate educational materials and side effects teaching were provided. Follow-up appointments reviewed. Opportunity for questions and clarification was provided.  Venous access removed without difficulty.  Patient's belongings gathered and sent with patient. Patient is ready for discharge.     Avelina Davila RN

## 2024-02-07 NOTE — PLAN OF CARE
Problem: Safety - Adult  Goal: Free from fall injury  2/7/2024 1041 by Avelina Davila RN  Outcome: Completed  2/6/2024 2251 by Nanette Burton RN  Outcome: Progressing     Problem: ABCDS Injury Assessment  Goal: Absence of physical injury  2/7/2024 1041 by Avelina Davila RN  Outcome: Completed  2/6/2024 2251 by Nanette Burton RN  Outcome: Progressing     Problem: Safety - Adult  Goal: Free from fall injury  2/7/2024 1041 by Avelina Davila, RN  Outcome: Completed  2/6/2024 2251 by Nanette Burton RN  Outcome: Progressing     Problem: ABCDS Injury Assessment  Goal: Absence of physical injury  2/7/2024 1041 by Avelina Davila RN  Outcome: Completed  2/6/2024 2251 by Nanette Burton RN  Outcome: Progressing

## 2024-02-09 ENCOUNTER — HOSPITAL ENCOUNTER (OUTPATIENT)
Facility: HOSPITAL | Age: 89
Setting detail: INFUSION SERIES
Discharge: HOME | End: 2024-02-09
Payer: MEDICARE

## 2024-02-09 ENCOUNTER — TELEPHONE (OUTPATIENT)
Age: 89
End: 2024-02-09

## 2024-02-09 ENCOUNTER — OFFICE VISIT (OUTPATIENT)
Age: 89
End: 2024-02-09
Payer: MEDICARE

## 2024-02-09 VITALS
SYSTOLIC BLOOD PRESSURE: 142 MMHG | RESPIRATION RATE: 19 BRPM | BODY MASS INDEX: 22.15 KG/M2 | WEIGHT: 125 LBS | HEIGHT: 63 IN | DIASTOLIC BLOOD PRESSURE: 65 MMHG | OXYGEN SATURATION: 98 % | HEART RATE: 71 BPM | TEMPERATURE: 98.2 F

## 2024-02-09 VITALS
DIASTOLIC BLOOD PRESSURE: 65 MMHG | HEIGHT: 63 IN | OXYGEN SATURATION: 98 % | HEART RATE: 71 BPM | SYSTOLIC BLOOD PRESSURE: 142 MMHG | WEIGHT: 125 LBS | TEMPERATURE: 98.2 F | BODY MASS INDEX: 22.15 KG/M2 | RESPIRATION RATE: 19 BRPM

## 2024-02-09 DIAGNOSIS — D46.4 REFRACTORY ANEMIA DUE TO MYELODYSPLASTIC SYNDROME (HCC): ICD-10-CM

## 2024-02-09 DIAGNOSIS — D46.9 MDS (MYELODYSPLASTIC SYNDROME) (HCC): Primary | ICD-10-CM

## 2024-02-09 DIAGNOSIS — D61.89 ANEMIA DUE TO OTHER BONE MARROW FAILURE (HCC): ICD-10-CM

## 2024-02-09 DIAGNOSIS — D47.1 PRIMARY MYELOFIBROSIS (HCC): Primary | ICD-10-CM

## 2024-02-09 DIAGNOSIS — D47.1 MYELOPROLIFERATIVE DISEASE (HCC): ICD-10-CM

## 2024-02-09 DIAGNOSIS — D69.6 THROMBOCYTOPENIA (HCC): ICD-10-CM

## 2024-02-09 LAB
BASOPHILS # BLD: 0 K/UL (ref 0–0.1)
BASOPHILS NFR BLD: 1 % (ref 0–1)
DIFFERENTIAL METHOD BLD: ABNORMAL
EOSINOPHIL # BLD: 0 K/UL (ref 0–0.4)
EOSINOPHIL NFR BLD: 1 % (ref 0–7)
ERYTHROCYTE [DISTWIDTH] IN BLOOD BY AUTOMATED COUNT: 17.6 % (ref 11.5–14.5)
HCT VFR BLD AUTO: 30 % (ref 35–47)
HGB BLD-MCNC: 9.6 G/DL (ref 11.5–16)
IMM GRANULOCYTES # BLD AUTO: 0 K/UL (ref 0–0.04)
IMM GRANULOCYTES NFR BLD AUTO: 0 % (ref 0–0.5)
LYMPHOCYTES # BLD: 0.9 K/UL (ref 0.8–3.5)
LYMPHOCYTES NFR BLD: 23 % (ref 12–49)
MCH RBC QN AUTO: 28.5 PG (ref 26–34)
MCHC RBC AUTO-ENTMCNC: 32 G/DL (ref 30–36.5)
MCV RBC AUTO: 89 FL (ref 80–99)
MONOCYTES # BLD: 0.4 K/UL (ref 0–1)
MONOCYTES NFR BLD: 9 % (ref 5–13)
NEUTS BAND NFR BLD MANUAL: 2 %
NEUTS SEG # BLD: 2.6 K/UL (ref 1.8–8)
NEUTS SEG NFR BLD: 64 % (ref 32–75)
NRBC # BLD: 0.09 K/UL (ref 0–0.01)
NRBC BLD-RTO: 2.3 PER 100 WBC
PLATELET # BLD AUTO: 61 K/UL (ref 150–400)
RBC # BLD AUTO: 3.37 M/UL (ref 3.8–5.2)
RBC MORPH BLD: ABNORMAL
WBC # BLD AUTO: 3.9 K/UL (ref 3.6–11)

## 2024-02-09 PROCEDURE — 99215 OFFICE O/P EST HI 40 MIN: CPT | Performed by: INTERNAL MEDICINE

## 2024-02-09 PROCEDURE — 85025 COMPLETE CBC W/AUTO DIFF WBC: CPT

## 2024-02-09 PROCEDURE — 1123F ACP DISCUSS/DSCN MKR DOCD: CPT | Performed by: INTERNAL MEDICINE

## 2024-02-09 PROCEDURE — 36415 COLL VENOUS BLD VENIPUNCTURE: CPT

## 2024-02-09 RX ORDER — ALBUTEROL SULFATE 90 UG/1
4 AEROSOL, METERED RESPIRATORY (INHALATION) PRN
OUTPATIENT
Start: 2024-02-14

## 2024-02-09 RX ORDER — SODIUM CHLORIDE 9 MG/ML
INJECTION, SOLUTION INTRAVENOUS CONTINUOUS
Status: CANCELLED | OUTPATIENT
Start: 2024-02-12

## 2024-02-09 RX ORDER — EPINEPHRINE 1 MG/ML
0.3 INJECTION, SOLUTION, CONCENTRATE INTRAVENOUS PRN
OUTPATIENT
Start: 2024-02-14

## 2024-02-09 RX ORDER — MOMELOTINIB 200 MG/1
200 TABLET ORAL DAILY
Qty: 30 TABLET | Refills: 11 | Status: SHIPPED | OUTPATIENT
Start: 2024-02-09

## 2024-02-09 RX ORDER — SODIUM CHLORIDE 9 MG/ML
20 INJECTION, SOLUTION INTRAVENOUS CONTINUOUS
OUTPATIENT
Start: 2024-02-14

## 2024-02-09 RX ORDER — EPINEPHRINE 1 MG/ML
0.3 INJECTION, SOLUTION INTRAMUSCULAR; SUBCUTANEOUS PRN
Status: CANCELLED | OUTPATIENT
Start: 2024-02-12

## 2024-02-09 RX ORDER — DIPHENHYDRAMINE HCL 25 MG
25 TABLET ORAL ONCE
OUTPATIENT
Start: 2024-02-14 | End: 2024-02-14

## 2024-02-09 RX ORDER — SODIUM CHLORIDE 9 MG/ML
INJECTION, SOLUTION INTRAVENOUS CONTINUOUS
OUTPATIENT
Start: 2024-02-14

## 2024-02-09 RX ORDER — ACETAMINOPHEN 325 MG/1
650 TABLET ORAL ONCE
OUTPATIENT
Start: 2024-02-14 | End: 2024-02-14

## 2024-02-09 RX ORDER — DIPHENHYDRAMINE HYDROCHLORIDE 50 MG/ML
50 INJECTION INTRAMUSCULAR; INTRAVENOUS
OUTPATIENT
Start: 2024-02-14

## 2024-02-09 RX ORDER — ONDANSETRON 2 MG/ML
8 INJECTION INTRAMUSCULAR; INTRAVENOUS
Status: CANCELLED | OUTPATIENT
Start: 2024-02-12

## 2024-02-09 RX ORDER — ALBUTEROL SULFATE 90 UG/1
4 AEROSOL, METERED RESPIRATORY (INHALATION) PRN
Status: CANCELLED | OUTPATIENT
Start: 2024-02-12

## 2024-02-09 RX ORDER — ACETAMINOPHEN 325 MG/1
650 TABLET ORAL
Status: CANCELLED | OUTPATIENT
Start: 2024-02-12

## 2024-02-09 RX ORDER — DIPHENHYDRAMINE HYDROCHLORIDE 50 MG/ML
50 INJECTION INTRAMUSCULAR; INTRAVENOUS
Status: CANCELLED | OUTPATIENT
Start: 2024-02-12

## 2024-02-09 RX ORDER — SODIUM CHLORIDE 0.9 % (FLUSH) 0.9 %
5-40 SYRINGE (ML) INJECTION PRN
OUTPATIENT
Start: 2024-02-14

## 2024-02-09 RX ORDER — SODIUM CHLORIDE 9 MG/ML
25 INJECTION, SOLUTION INTRAVENOUS PRN
OUTPATIENT
Start: 2024-02-14

## 2024-02-09 RX ORDER — ACETAMINOPHEN 325 MG/1
650 TABLET ORAL
OUTPATIENT
Start: 2024-02-14

## 2024-02-09 RX ORDER — ONDANSETRON 2 MG/ML
8 INJECTION INTRAMUSCULAR; INTRAVENOUS
OUTPATIENT
Start: 2024-02-14

## 2024-02-09 NOTE — PROGRESS NOTES
Pt arrived at [facility] ambulatory and in no acute distress for Reblozyl injection. Assessment unremarkable, patient voiced no new complaints or concerns at this time. CBC drawn and sent for processing. [place] blood bank tube not drawn, refused by patient and family 2/2 having a blood transfusion on 2/7/2024. Hemoglobin resulted at 9.6 g/dL.     Patient and family proceeded to appointment with Dr. Elena.   Per physician, patient will not be getting her injection today, they will be going a different route and attempting a new regimen.       Patient Vitals for the past 12 hrs:   Temp Pulse Resp BP SpO2   02/09/24 0946 98.2 °F (36.8 °C) 71 19 (!) 142/65 98 %     Recent Results (from the past 12 hour(s))   CBC with Auto Differential    Collection Time: 02/09/24 10:02 AM   Result Value Ref Range    WBC 3.9 3.6 - 11.0 K/uL    RBC 3.37 (L) 3.80 - 5.20 M/uL    Hemoglobin 9.6 (L) 11.5 - 16.0 g/dL    Hematocrit 30.0 (L) 35.0 - 47.0 %    MCV 89.0 80.0 - 99.0 FL    MCH 28.5 26.0 - 34.0 PG    MCHC 32.0 30.0 - 36.5 g/dL    RDW 17.6 (H) 11.5 - 14.5 %    Platelets 61 (L) 150 - 400 K/uL    Nucleated RBCs 2.3 (H) 0  WBC    nRBC 0.09 (H) 0.00 - 0.01 K/uL    Neutrophils % 64 32 - 75 %    Band Neutrophils 2 %    Lymphocytes % 23 12 - 49 %    Monocytes % 9 5 - 13 %    Eosinophils % 1 0 - 7 %    Basophils % 1 0 - 1 %    Immature Granulocytes 0 0.0 - 0.5 %    Neutrophils Absolute 2.6 1.8 - 8.0 K/UL    Lymphocytes Absolute 0.9 0.8 - 3.5 K/UL    Monocytes Absolute 0.4 0.0 - 1.0 K/UL    Eosinophils Absolute 0.0 0.0 - 0.4 K/UL    Basophils Absolute 0.0 0.0 - 0.1 K/UL    Absolute Immature Granulocyte 0.0 0.00 - 0.04 K/UL    Differential Type AUTOMATED      RBC Comment ANISOCYTOSIS  1+               Future Appointments   Date Time Provider Department Center   2/12/2024  9:30 AM HOMERO BROWNING 3 [facility]   2/13/2024  9:30 AM [facility]   2/19/2024  8:30 AM HOMERO BROWNING 4 [facility]   2/20/2024  9:30 AM LAB_[facility]   2/26/2024

## 2024-02-09 NOTE — PROGRESS NOTES
[facility] [place]  at [facility]  [address], [address], [address]  [place] [address]  [phone]    Hematology Note        Patient: Esther Kaiser MRN: [id number]  SSN: [id number]    YOB: 1935  Age: 88 y.o.  Sex: female      Subjective:      Esther Kaiser is a 88 y.o. female who I am seeing for anemia. She has been experiencing progressive dyspnea. Anemia has worsened over the last few months. She has received RBC transfusion which improved the Hgb but it continues to drift downwards. She is fatigued. 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       Review of Systems:  Constitutional: positive for fatigue  Eyes: negative  Ears, Nose, Mouth, Throat, and Face: negative  Respiratory: negative  Cardiovascular: negative  Gastrointestinal: negative  Genitourinary:negative  Integument/Breast: negative  Hematologic/Lymphatic: negative  Musculoskeletal:negative  Neurological: negative      No past medical history on file.  No past surgical history on file.   No family history on file.  Social History     Tobacco Use    Smoking status: Never    Smokeless tobacco: Never   Substance Use Topics    Alcohol use: Not Currently      Prior to Admission medications    Medication Sig Start Date End Date Taking? Authorizing Provider   montelukast (SINGULAIR) 10 MG tablet Take 1 tablet by mouth nightly 8/5/23  Yes Delores Zavala MD   atorvastatin (LIPITOR) 10 MG tablet Take 1 tablet by mouth daily 1/10/24  Yes Messi Stock MD   pantoprazole (PROTONIX) 40 MG tablet Take 1 tablet by mouth in the morning and at bedtime 12/5/23  Yes Messi Stock MD   Travoprost, MAGY Free, (TRAVATAN Z) 0.004 % SOLN ophthalmic solution  10/11/23  Yes ProviderDelores MD            Allergies   Allergen Reactions    Codeine Other (See Comments)    Diazepam Other (See Comments)    Penicillins Other (See Comments)     Tongue Swelling    Sulfa Antibiotics

## 2024-02-12 ENCOUNTER — TELEPHONE (OUTPATIENT)
Age: 89
End: 2024-02-12

## 2024-02-12 ENCOUNTER — HOSPITAL ENCOUNTER (OUTPATIENT)
Facility: HOSPITAL | Age: 89
Setting detail: INFUSION SERIES
End: 2024-02-12

## 2024-02-12 NOTE — PROGRESS NOTES
Call placed to patient's daughter.  She just wanted to discuss the goal of the new medication.  Advised Verito that Ojjaara is given to hopeful improve her bone marrow function to decrease need for blood transfusions.  Advised her that this does not fix the bone marrow scar tissue but hopefully will get her needing less blood transfusions.  Understanding voiced and no additional questions at this time.

## 2024-02-12 NOTE — TELEPHONE ENCOUNTER
Patient daughter called and stated that she would like a call back to go over some questions about the patients diagnoses and what was talked about in last visit.       # [phone]

## 2024-02-12 NOTE — TELEPHONE ENCOUNTER
Call placed to daughter, Verito.  Discussed that Ojjaara medication is to hopefully reduce the frequency of blood transfusion by increasing red blood cell production from bone marrow.  Discussed that this will not change the myelofibrosis and is only given to reduce transfusion dependence.     Understanding voiced, No additional questions or concerns at this time.

## 2024-02-13 ENCOUNTER — TELEPHONE (OUTPATIENT)
Age: 89
End: 2024-02-13

## 2024-02-13 ENCOUNTER — HOSPITAL ENCOUNTER (OUTPATIENT)
Facility: HOSPITAL | Age: 89
Setting detail: INFUSION SERIES
Discharge: HOME | End: 2024-02-13
Payer: MEDICARE

## 2024-02-13 VITALS
SYSTOLIC BLOOD PRESSURE: 159 MMHG | HEART RATE: 77 BPM | OXYGEN SATURATION: 97 % | DIASTOLIC BLOOD PRESSURE: 78 MMHG | TEMPERATURE: 98.3 F | BODY MASS INDEX: 21.48 KG/M2 | RESPIRATION RATE: 18 BRPM | WEIGHT: 121.2 LBS

## 2024-02-13 DIAGNOSIS — D46.4 REFRACTORY ANEMIA DUE TO MYELODYSPLASTIC SYNDROME (HCC): ICD-10-CM

## 2024-02-13 DIAGNOSIS — R26.9 GAIT ABNORMALITY: ICD-10-CM

## 2024-02-13 DIAGNOSIS — D46.9 MDS (MYELODYSPLASTIC SYNDROME) (HCC): ICD-10-CM

## 2024-02-13 DIAGNOSIS — R53.1 WEAKNESS: ICD-10-CM

## 2024-02-13 DIAGNOSIS — Z86.73 HISTORY OF CVA (CEREBROVASCULAR ACCIDENT): ICD-10-CM

## 2024-02-13 DIAGNOSIS — D46.4 REFRACTORY ANEMIA DUE TO MYELODYSPLASTIC SYNDROME (HCC): Primary | ICD-10-CM

## 2024-02-13 LAB
BASOPHILS # BLD: 0 K/UL (ref 0–0.1)
BASOPHILS NFR BLD: 0 % (ref 0–1)
DIFFERENTIAL METHOD BLD: ABNORMAL
EOSINOPHIL # BLD: 0 K/UL (ref 0–0.4)
EOSINOPHIL NFR BLD: 0 % (ref 0–7)
ERYTHROCYTE [DISTWIDTH] IN BLOOD BY AUTOMATED COUNT: 17.4 % (ref 11.5–14.5)
HCT VFR BLD AUTO: 28.4 % (ref 35–47)
HGB BLD-MCNC: 8.9 G/DL (ref 11.5–16)
IMM GRANULOCYTES # BLD AUTO: 0 K/UL (ref 0–0.04)
IMM GRANULOCYTES NFR BLD AUTO: 0 % (ref 0–0.5)
LYMPHOCYTES # BLD: 1 K/UL (ref 0.8–3.5)
LYMPHOCYTES NFR BLD: 26 % (ref 12–49)
MCH RBC QN AUTO: 28 PG (ref 26–34)
MCHC RBC AUTO-ENTMCNC: 31.3 G/DL (ref 30–36.5)
MCV RBC AUTO: 89.3 FL (ref 80–99)
MONOCYTES # BLD: 0.2 K/UL (ref 0–1)
MONOCYTES NFR BLD: 4 % (ref 5–13)
NEUTS BAND NFR BLD MANUAL: 2 %
NEUTS SEG # BLD: 2.8 K/UL (ref 1.8–8)
NEUTS SEG NFR BLD: 68 % (ref 32–75)
NRBC # BLD: 0.03 K/UL (ref 0–0.01)
NRBC BLD-RTO: 0.7 PER 100 WBC
PLATELET # BLD AUTO: 65 K/UL (ref 150–400)
PLATELET COMMENT: ABNORMAL
RBC # BLD AUTO: 3.18 M/UL (ref 3.8–5.2)
RBC MORPH BLD: ABNORMAL
RBC MORPH BLD: ABNORMAL
WBC # BLD AUTO: 4 K/UL (ref 3.6–11)

## 2024-02-13 PROCEDURE — 85025 COMPLETE CBC W/AUTO DIFF WBC: CPT

## 2024-02-13 PROCEDURE — 36415 COLL VENOUS BLD VENIPUNCTURE: CPT

## 2024-02-13 NOTE — TELEPHONE ENCOUNTER
Care Transitions Initial Follow Up Call    Outreach made within 2 business days of discharge: Yes    Patient: Esther Kaiser Patient : 1935   MRN: [id number]  Reason for Admission: There are no discharge diagnoses documented for the most recent discharge.  Discharge Date: 24       Spoke with: Verito     Discharge department/facility: [facility]    TCM Interactive Patient Contact:  Was patient able to fill all prescriptions: Yes  Was patient instructed to bring all medications to the follow-up visit: Yes  Is patient taking all medications as directed in the discharge summary? Yes  Does patient understand their discharge instructions: Yes  Does patient have questions or concerns that need addressed prior to 7-14 day follow up office visit: NO Pt saw oncology and doesn't feel like she needs to be seen right now by pcp.         Follow Up  Future Appointments   Date Time Provider Department Center   2024  8:00 AM VALENTIN FASTRACK 4 [facility]   2024  8:00 AM VALENTIN FASTRACK 2 [facility]   2024  8:00 AM VALENTIN MED1 [place] [facility]   3/1/2024  9:30 AM VALENTIN LONG1 [place] [facility]   3/6/2024  8:00 AM VALENTIN FASTRACK 2 [facility]   3/13/2024  8:00 AM VALENTIN FASTRACK 6 [facility]   3/22/2024  9:30 AM VALENTIN LONG3 [place] [facility]   2024  9:30 AM VALENTIN LONG3 [place] [facility]   5/3/2024  9:30 AM VALENTIN MED4 [place] [facility]       Milena Arenas MA

## 2024-02-13 NOTE — PROGRESS NOTES
[facility] Infusion [facility] Lab Draw Note:    BP (!) 159/78   Pulse 77   Temp 98.3 °F (36.8 °C)   Resp 18   Wt 55 kg (121 lb 3.2 oz)   LMP  (LMP Unknown)   SpO2 97%   BMI 21.48 kg/m²     Labs drawn peripherally from right arm - CBC w/ diff, Extra tube to blood bank    Tolerated well.  Pt denies any acute problems/changes. Discharged from hospitals ambulatory. No distress. Next appt: 2/21/24       See [facility] for pending lab results.   received fax from [facility] stating the [facility] 7.5MG/0.5ML Subcutaneous Solution Pen-Injector has been approved and is valid from 05/09/2023 through 06/07/2024    Called pt no answer. LMTCB

## 2024-02-13 NOTE — TELEPHONE ENCOUNTER
Not sure what she is trying to refer to by \"stool \".    Describe what it is that you need the product to do.  We can figure out the name

## 2024-02-13 NOTE — TELEPHONE ENCOUNTER
Daughter is calling back stating she forgot to mention she wanted a stool  with arms also she wasn't sure if that is what it was called

## 2024-02-19 ENCOUNTER — TELEPHONE (OUTPATIENT)
Age: 89
End: 2024-02-19

## 2024-02-19 ENCOUNTER — APPOINTMENT (OUTPATIENT)
Facility: HOSPITAL | Age: 89
End: 2024-02-19
Payer: MEDICARE

## 2024-02-19 NOTE — TELEPHONE ENCOUNTER
Pt daughter (Verito) is returning Milena's call to discuss medical equipment's phone number    Verito has misplaced the phone number and is requesting the number again please

## 2024-02-21 ENCOUNTER — HOSPITAL ENCOUNTER (OUTPATIENT)
Facility: HOSPITAL | Age: 89
Setting detail: INFUSION SERIES
Discharge: HOME | End: 2024-02-21
Payer: MEDICARE

## 2024-02-21 DIAGNOSIS — D46.4 REFRACTORY ANEMIA DUE TO MYELODYSPLASTIC SYNDROME (HCC): Primary | ICD-10-CM

## 2024-02-21 DIAGNOSIS — D46.9 MDS (MYELODYSPLASTIC SYNDROME) (HCC): ICD-10-CM

## 2024-02-21 LAB
BASOPHILS # BLD: 0 K/UL (ref 0–0.1)
BASOPHILS NFR BLD: 0 % (ref 0–1)
DIFFERENTIAL METHOD BLD: ABNORMAL
EOSINOPHIL # BLD: 0 K/UL (ref 0–0.4)
EOSINOPHIL NFR BLD: 0 % (ref 0–7)
ERYTHROCYTE [DISTWIDTH] IN BLOOD BY AUTOMATED COUNT: 17.6 % (ref 11.5–14.5)
HCT VFR BLD AUTO: 24.2 % (ref 35–47)
HGB BLD-MCNC: 7.3 G/DL (ref 11.5–16)
HISTORY CHECK: NORMAL
IMM GRANULOCYTES # BLD AUTO: 0 K/UL (ref 0–0.04)
IMM GRANULOCYTES NFR BLD AUTO: 0 % (ref 0–0.5)
LYMPHOCYTES # BLD: 1.7 K/UL (ref 0.8–3.5)
LYMPHOCYTES NFR BLD: 64 % (ref 12–49)
MCH RBC QN AUTO: 27.2 PG (ref 26–34)
MCHC RBC AUTO-ENTMCNC: 30.2 G/DL (ref 30–36.5)
MCV RBC AUTO: 90.3 FL (ref 80–99)
MONOCYTES # BLD: 0.1 K/UL (ref 0–1)
MONOCYTES NFR BLD: 2 % (ref 5–13)
MYELOCYTES NFR BLD MANUAL: 3 %
NEUTS BAND NFR BLD MANUAL: 1 %
NEUTS SEG # BLD: 0.8 K/UL (ref 1.8–8)
NEUTS SEG NFR BLD: 30 % (ref 32–75)
NRBC # BLD: 0.07 K/UL (ref 0–0.01)
NRBC BLD-RTO: 2.6 PER 100 WBC
PLATELET # BLD AUTO: 92 K/UL (ref 150–400)
PLATELET COMMENT: ABNORMAL
PMV BLD AUTO: 9.3 FL (ref 8.9–12.9)
RBC # BLD AUTO: 2.68 M/UL (ref 3.8–5.2)
RBC MORPH BLD: ABNORMAL
WBC # BLD AUTO: 2.7 K/UL (ref 3.6–11)
WBC MORPH BLD: ABNORMAL

## 2024-02-21 PROCEDURE — 36415 COLL VENOUS BLD VENIPUNCTURE: CPT

## 2024-02-21 PROCEDURE — 86923 COMPATIBILITY TEST ELECTRIC: CPT

## 2024-02-21 PROCEDURE — 86901 BLOOD TYPING SEROLOGIC RH(D): CPT

## 2024-02-21 PROCEDURE — 86850 RBC ANTIBODY SCREEN: CPT

## 2024-02-21 PROCEDURE — 86900 BLOOD TYPING SEROLOGIC ABO: CPT

## 2024-02-21 PROCEDURE — 85025 COMPLETE CBC W/AUTO DIFF WBC: CPT

## 2024-02-21 RX ORDER — SODIUM CHLORIDE 0.9 % (FLUSH) 0.9 %
5-40 SYRINGE (ML) INJECTION PRN
Status: CANCELLED | OUTPATIENT
Start: 2024-02-21

## 2024-02-21 RX ORDER — SODIUM CHLORIDE 9 MG/ML
20 INJECTION, SOLUTION INTRAVENOUS CONTINUOUS
Status: CANCELLED | OUTPATIENT
Start: 2024-02-21

## 2024-02-21 RX ORDER — ALBUTEROL SULFATE 90 UG/1
4 AEROSOL, METERED RESPIRATORY (INHALATION) PRN
OUTPATIENT
Start: 2024-02-21

## 2024-02-21 RX ORDER — DIPHENHYDRAMINE HYDROCHLORIDE 50 MG/ML
50 INJECTION INTRAMUSCULAR; INTRAVENOUS
OUTPATIENT
Start: 2024-02-21

## 2024-02-21 RX ORDER — DIPHENHYDRAMINE HCL 25 MG
25 CAPSULE ORAL ONCE
Status: CANCELLED | OUTPATIENT
Start: 2024-02-21 | End: 2024-02-21

## 2024-02-21 RX ORDER — ONDANSETRON 2 MG/ML
8 INJECTION INTRAMUSCULAR; INTRAVENOUS
OUTPATIENT
Start: 2024-02-21

## 2024-02-21 RX ORDER — ACETAMINOPHEN 325 MG/1
650 TABLET ORAL
OUTPATIENT
Start: 2024-02-21

## 2024-02-21 RX ORDER — ACETAMINOPHEN 325 MG/1
650 TABLET ORAL ONCE
Status: CANCELLED | OUTPATIENT
Start: 2024-02-21 | End: 2024-02-21

## 2024-02-21 RX ORDER — SODIUM CHLORIDE 9 MG/ML
25 INJECTION, SOLUTION INTRAVENOUS PRN
OUTPATIENT
Start: 2024-02-21

## 2024-02-21 RX ORDER — SODIUM CHLORIDE 9 MG/ML
INJECTION, SOLUTION INTRAVENOUS CONTINUOUS
OUTPATIENT
Start: 2024-02-21

## 2024-02-21 RX ORDER — EPINEPHRINE 1 MG/ML
0.3 INJECTION, SOLUTION INTRAMUSCULAR; SUBCUTANEOUS PRN
OUTPATIENT
Start: 2024-02-21

## 2024-02-21 NOTE — PROGRESS NOTES
[facility] Infusion Center Lab Draw Note:  Arrived - 0810    Labs drawn peripherally from R arm - CBC, SBB    No results found for this or any previous visit (from the past 12 hour(s)).    0815 - Tolerated well.  Pt denies any acute problems/changes. Discharged from [facility] ambulatory. No distress. Next appt: 2/28/24 at 0800       See EPIC for pending lab results.   Surgery scheduled for 6/15/2020

## 2024-02-22 ENCOUNTER — HOSPITAL ENCOUNTER (OUTPATIENT)
Facility: HOSPITAL | Age: 89
Setting detail: INFUSION SERIES
Discharge: HOME | End: 2024-02-22
Payer: MEDICARE

## 2024-02-22 VITALS
DIASTOLIC BLOOD PRESSURE: 77 MMHG | RESPIRATION RATE: 18 BRPM | SYSTOLIC BLOOD PRESSURE: 126 MMHG | HEART RATE: 87 BPM | OXYGEN SATURATION: 100 % | TEMPERATURE: 98.1 F

## 2024-02-22 DIAGNOSIS — D47.1 PRIMARY MYELOFIBROSIS (HCC): Primary | ICD-10-CM

## 2024-02-22 DIAGNOSIS — D46.4 REFRACTORY ANEMIA DUE TO MYELODYSPLASTIC SYNDROME (HCC): Primary | ICD-10-CM

## 2024-02-22 PROCEDURE — P9016 RBC LEUKOCYTES REDUCED: HCPCS

## 2024-02-22 PROCEDURE — 6370000000 HC RX 637 (ALT 250 FOR IP): Performed by: NURSE PRACTITIONER

## 2024-02-22 PROCEDURE — 36430 TRANSFUSION BLD/BLD COMPNT: CPT

## 2024-02-22 RX ORDER — DIPHENHYDRAMINE HYDROCHLORIDE 50 MG/ML
50 INJECTION INTRAMUSCULAR; INTRAVENOUS
OUTPATIENT
Start: 2024-02-22

## 2024-02-22 RX ORDER — SODIUM CHLORIDE 9 MG/ML
20 INJECTION, SOLUTION INTRAVENOUS CONTINUOUS
Status: DISCONTINUED | OUTPATIENT
Start: 2024-02-22 | End: 2024-02-23 | Stop reason: HOSPADM

## 2024-02-22 RX ORDER — EPINEPHRINE 1 MG/ML
0.3 INJECTION, SOLUTION INTRAMUSCULAR; SUBCUTANEOUS PRN
OUTPATIENT
Start: 2024-02-22

## 2024-02-22 RX ORDER — DIPHENHYDRAMINE HCL 25 MG
25 CAPSULE ORAL ONCE
Status: COMPLETED | OUTPATIENT
Start: 2024-02-22 | End: 2024-02-22

## 2024-02-22 RX ORDER — ACETAMINOPHEN 325 MG/1
650 TABLET ORAL ONCE
OUTPATIENT
Start: 2024-02-22 | End: 2024-02-22

## 2024-02-22 RX ORDER — ACETAMINOPHEN 325 MG/1
650 TABLET ORAL ONCE
Status: COMPLETED | OUTPATIENT
Start: 2024-02-22 | End: 2024-02-22

## 2024-02-22 RX ORDER — ACETAMINOPHEN 325 MG/1
650 TABLET ORAL
OUTPATIENT
Start: 2024-02-22

## 2024-02-22 RX ORDER — SODIUM CHLORIDE 9 MG/ML
25 INJECTION, SOLUTION INTRAVENOUS PRN
OUTPATIENT
Start: 2024-02-22

## 2024-02-22 RX ORDER — SODIUM CHLORIDE 9 MG/ML
INJECTION, SOLUTION INTRAVENOUS CONTINUOUS
OUTPATIENT
Start: 2024-02-22

## 2024-02-22 RX ORDER — SODIUM CHLORIDE 9 MG/ML
20 INJECTION, SOLUTION INTRAVENOUS CONTINUOUS
OUTPATIENT
Start: 2024-02-22

## 2024-02-22 RX ORDER — ONDANSETRON 2 MG/ML
8 INJECTION INTRAMUSCULAR; INTRAVENOUS
OUTPATIENT
Start: 2024-02-22

## 2024-02-22 RX ORDER — DIPHENHYDRAMINE HCL 25 MG
25 CAPSULE ORAL ONCE
OUTPATIENT
Start: 2024-02-22 | End: 2024-02-22

## 2024-02-22 RX ORDER — SODIUM CHLORIDE 0.9 % (FLUSH) 0.9 %
5-40 SYRINGE (ML) INJECTION PRN
Status: DISCONTINUED | OUTPATIENT
Start: 2024-02-22 | End: 2024-02-23 | Stop reason: HOSPADM

## 2024-02-22 RX ORDER — SODIUM CHLORIDE 0.9 % (FLUSH) 0.9 %
5-40 SYRINGE (ML) INJECTION PRN
OUTPATIENT
Start: 2024-02-22

## 2024-02-22 RX ORDER — ALBUTEROL SULFATE 90 UG/1
4 AEROSOL, METERED RESPIRATORY (INHALATION) PRN
OUTPATIENT
Start: 2024-02-22

## 2024-02-22 RX ADMIN — ACETAMINOPHEN 650 MG: 325 TABLET ORAL at 08:15

## 2024-02-22 RX ADMIN — DIPHENHYDRAMINE HYDROCHLORIDE 25 MG: 25 CAPSULE ORAL at 08:15

## 2024-02-22 NOTE — PROGRESS NOTES
0800 Pt arrived at [facility] ambulatory and in no distress for transfusion of 1 unit PRBCs.  Assessment completed, no new complaints voiced.  IV established in right arm without difficulty.  Signs/symptoms of adverse blood reaction discussed with pt, voiced understanding.     Patient Vitals for the past 24 hrs:   BP Temp Temp src Pulse Resp SpO2   02/22/24 0836 125/64 98.1 °F (36.7 °C) Temporal 81 18 100 %   02/22/24 0801 132/73 98.1 °F (36.7 °C) -- 80 20 --         Medications received:  Medications Administered         acetaminophen (TYLENOL) tablet 650 mg Admin Date  02/22/2024 Action  Given Dose  650 mg Route  Oral Administered By  Hilaria Martinez RN        diphenhydrAMINE (BENADRYL) capsule 25 mg Admin Date  02/22/2024 Action  Given Dose  25 mg Route  Oral Administered By  Hilaria Martinez, DEREK            0840: 1st unit PRBCs started and infusing without difficulty, observed x 15 minutes  1115:  1st unit completed without adverse reaction noted, NS flushing line.    Tolerated transfusion well, no adverse reaction noted.  D/C instructions reviewed, copy to pt, voiced understanding.  D/Cd from [facility] ambulatory at 1130 and in no distress accompanied by daughter.  Next appt 02/28/2024.

## 2024-02-26 ENCOUNTER — APPOINTMENT (OUTPATIENT)
Facility: HOSPITAL | Age: 89
End: 2024-02-26
Payer: MEDICARE

## 2024-02-28 ENCOUNTER — HOSPITAL ENCOUNTER (OUTPATIENT)
Facility: HOSPITAL | Age: 89
Setting detail: INFUSION SERIES
Discharge: HOME | End: 2024-02-28
Payer: MEDICARE

## 2024-02-28 DIAGNOSIS — D46.4 REFRACTORY ANEMIA DUE TO MYELODYSPLASTIC SYNDROME (HCC): ICD-10-CM

## 2024-02-28 DIAGNOSIS — D46.9 MDS (MYELODYSPLASTIC SYNDROME) (HCC): ICD-10-CM

## 2024-02-28 LAB
BASOPHILS # BLD: 0 K/UL (ref 0–0.1)
BASOPHILS NFR BLD: 0 % (ref 0–1)
DIFFERENTIAL METHOD BLD: ABNORMAL
EOSINOPHIL # BLD: 0 K/UL (ref 0–0.4)
EOSINOPHIL NFR BLD: 0 % (ref 0–7)
ERYTHROCYTE [DISTWIDTH] IN BLOOD BY AUTOMATED COUNT: 16.5 % (ref 11.5–14.5)
HCT VFR BLD AUTO: 28.7 % (ref 35–47)
HGB BLD-MCNC: 9.1 G/DL (ref 11.5–16)
IMM GRANULOCYTES # BLD AUTO: 0 K/UL (ref 0–0.04)
IMM GRANULOCYTES NFR BLD AUTO: 0 % (ref 0–0.5)
LYMPHOCYTES # BLD: 1.2 K/UL (ref 0.8–3.5)
LYMPHOCYTES NFR BLD: 62 % (ref 12–49)
MCH RBC QN AUTO: 28.1 PG (ref 26–34)
MCHC RBC AUTO-ENTMCNC: 31.7 G/DL (ref 30–36.5)
MCV RBC AUTO: 88.6 FL (ref 80–99)
METAMYELOCYTES NFR BLD MANUAL: 2 %
MONOCYTES # BLD: 0 K/UL (ref 0–1)
MONOCYTES NFR BLD: 0 % (ref 5–13)
NEUTS BAND NFR BLD MANUAL: 1 %
NEUTS SEG # BLD: 0.7 K/UL (ref 1.8–8)
NEUTS SEG NFR BLD: 35 % (ref 32–75)
NRBC # BLD: 0.03 K/UL (ref 0–0.01)
NRBC BLD-RTO: 1.5 PER 100 WBC
PLATELET # BLD AUTO: 75 K/UL (ref 150–400)
PLATELET COMMENT: ABNORMAL
PMV BLD AUTO: 10.4 FL (ref 8.9–12.9)
RBC # BLD AUTO: 3.24 M/UL (ref 3.8–5.2)
RBC MORPH BLD: ABNORMAL
RBC MORPH BLD: ABNORMAL
WBC # BLD AUTO: 2 K/UL (ref 3.6–11)
WBC MORPH BLD: ABNORMAL

## 2024-02-28 PROCEDURE — 36415 COLL VENOUS BLD VENIPUNCTURE: CPT

## 2024-02-28 PROCEDURE — 85025 COMPLETE CBC W/AUTO DIFF WBC: CPT

## 2024-02-29 ENCOUNTER — HOSPITAL ENCOUNTER (OUTPATIENT)
Facility: HOSPITAL | Age: 89
Setting detail: INFUSION SERIES
End: 2024-02-29

## 2024-03-01 ENCOUNTER — HOSPITAL ENCOUNTER (OUTPATIENT)
Facility: HOSPITAL | Age: 89
Setting detail: INFUSION SERIES
End: 2024-03-01

## 2024-03-04 ENCOUNTER — APPOINTMENT (OUTPATIENT)
Facility: HOSPITAL | Age: 89
End: 2024-03-04
Payer: MEDICARE

## 2024-03-04 NOTE — PROGRESS NOTES
Esther Kaiser is a 88 y.o. female here for follow up for myelofibrosis.   Started Momelatimib on Feb 14th   Pt has been having diarrhea since starting new medication. Did take anti-diarrhea meds this morning when she remembered she had some and helped.  Hands have been itching. Sleeping a lot and having wild dreams. 2 episodes of extreme coldness and shaking.   She is very weak.   -7 lbs since last visit. Not much appetite.     1. Have you been to the ER, urgent care clinic since your last visit?  Hospitalized since your last visit? no    2. Have you seen or consulted any other health care providers outside of the [facility] since your last visit?  Include any pap smears or colon screening.no

## 2024-03-06 ENCOUNTER — HOSPITAL ENCOUNTER (OUTPATIENT)
Facility: HOSPITAL | Age: 89
Setting detail: INFUSION SERIES
Discharge: HOME | End: 2024-03-06
Payer: MEDICARE

## 2024-03-06 ENCOUNTER — OFFICE VISIT (OUTPATIENT)
Age: 89
End: 2024-03-06
Payer: MEDICARE

## 2024-03-06 VITALS
WEIGHT: 118 LBS | TEMPERATURE: 97.7 F | HEIGHT: 63 IN | SYSTOLIC BLOOD PRESSURE: 116 MMHG | BODY MASS INDEX: 20.91 KG/M2 | OXYGEN SATURATION: 97 % | HEART RATE: 85 BPM | DIASTOLIC BLOOD PRESSURE: 57 MMHG

## 2024-03-06 DIAGNOSIS — D47.1 PRIMARY MYELOFIBROSIS (HCC): Primary | ICD-10-CM

## 2024-03-06 DIAGNOSIS — Z51.11 ENCOUNTER FOR CHEMOTHERAPY MANAGEMENT: ICD-10-CM

## 2024-03-06 DIAGNOSIS — D46.4 REFRACTORY ANEMIA DUE TO MYELODYSPLASTIC SYNDROME (HCC): Primary | ICD-10-CM

## 2024-03-06 DIAGNOSIS — D61.89 ANEMIA DUE TO OTHER BONE MARROW FAILURE (HCC): ICD-10-CM

## 2024-03-06 DIAGNOSIS — D46.9 MDS (MYELODYSPLASTIC SYNDROME) (HCC): ICD-10-CM

## 2024-03-06 LAB
BASOPHILS # BLD: 0 K/UL (ref 0–0.1)
BASOPHILS NFR BLD: 0 % (ref 0–1)
DIFFERENTIAL METHOD BLD: ABNORMAL
EOSINOPHIL # BLD: 0 K/UL (ref 0–0.4)
EOSINOPHIL NFR BLD: 0 % (ref 0–7)
ERYTHROCYTE [DISTWIDTH] IN BLOOD BY AUTOMATED COUNT: 16.6 % (ref 11.5–14.5)
HCT VFR BLD AUTO: 23.7 % (ref 35–47)
HGB BLD-MCNC: 7.4 G/DL (ref 11.5–16)
HISTORY CHECK: NORMAL
IMM GRANULOCYTES # BLD AUTO: 0 K/UL (ref 0–0.04)
IMM GRANULOCYTES NFR BLD AUTO: 0 % (ref 0–0.5)
LYMPHOCYTES # BLD: 1.9 K/UL (ref 0.8–3.5)
LYMPHOCYTES NFR BLD: 59 % (ref 12–49)
MCH RBC QN AUTO: 27.6 PG (ref 26–34)
MCHC RBC AUTO-ENTMCNC: 31.2 G/DL (ref 30–36.5)
MCV RBC AUTO: 88.4 FL (ref 80–99)
MONOCYTES # BLD: 0 K/UL (ref 0–1)
MONOCYTES NFR BLD: 1 % (ref 5–13)
NEUTS SEG # BLD: 1.2 K/UL (ref 1.8–8)
NEUTS SEG NFR BLD: 40 % (ref 32–75)
NRBC # BLD: 0.06 K/UL (ref 0–0.01)
NRBC BLD-RTO: 2 PER 100 WBC
PLATELET # BLD AUTO: 61 K/UL (ref 150–400)
PLATELET COMMENT: ABNORMAL
PMV BLD AUTO: 10 FL (ref 8.9–12.9)
RBC # BLD AUTO: 2.68 M/UL (ref 3.8–5.2)
RBC MORPH BLD: ABNORMAL
RBC MORPH BLD: ABNORMAL
WBC # BLD AUTO: 3.1 K/UL (ref 3.6–11)
WBC MORPH BLD: ABNORMAL

## 2024-03-06 PROCEDURE — 85025 COMPLETE CBC W/AUTO DIFF WBC: CPT

## 2024-03-06 PROCEDURE — 86901 BLOOD TYPING SEROLOGIC RH(D): CPT

## 2024-03-06 PROCEDURE — 99214 OFFICE O/P EST MOD 30 MIN: CPT | Performed by: INTERNAL MEDICINE

## 2024-03-06 PROCEDURE — 36415 COLL VENOUS BLD VENIPUNCTURE: CPT

## 2024-03-06 PROCEDURE — 86923 COMPATIBILITY TEST ELECTRIC: CPT

## 2024-03-06 PROCEDURE — 86850 RBC ANTIBODY SCREEN: CPT

## 2024-03-06 PROCEDURE — 86900 BLOOD TYPING SEROLOGIC ABO: CPT

## 2024-03-06 PROCEDURE — 1123F ACP DISCUSS/DSCN MKR DOCD: CPT | Performed by: INTERNAL MEDICINE

## 2024-03-06 RX ORDER — DIPHENHYDRAMINE HYDROCHLORIDE 50 MG/ML
50 INJECTION INTRAMUSCULAR; INTRAVENOUS
Status: CANCELLED | OUTPATIENT
Start: 2024-03-06

## 2024-03-06 RX ORDER — SODIUM CHLORIDE 0.9 % (FLUSH) 0.9 %
5-40 SYRINGE (ML) INJECTION PRN
Status: CANCELLED | OUTPATIENT
Start: 2024-03-08

## 2024-03-06 RX ORDER — SODIUM CHLORIDE 9 MG/ML
INJECTION, SOLUTION INTRAVENOUS CONTINUOUS
Status: CANCELLED | OUTPATIENT
Start: 2024-03-06

## 2024-03-06 RX ORDER — EPINEPHRINE 1 MG/ML
0.3 INJECTION, SOLUTION INTRAMUSCULAR; SUBCUTANEOUS PRN
Status: CANCELLED | OUTPATIENT
Start: 2024-03-06

## 2024-03-06 RX ORDER — SODIUM CHLORIDE 9 MG/ML
25 INJECTION, SOLUTION INTRAVENOUS PRN
Status: CANCELLED | OUTPATIENT
Start: 2024-03-06

## 2024-03-06 RX ORDER — SODIUM CHLORIDE 9 MG/ML
25 INJECTION, SOLUTION INTRAVENOUS PRN
Status: CANCELLED | OUTPATIENT
Start: 2024-03-08

## 2024-03-06 RX ORDER — DIPHENHYDRAMINE HYDROCHLORIDE 50 MG/ML
50 INJECTION INTRAMUSCULAR; INTRAVENOUS
Status: CANCELLED | OUTPATIENT
Start: 2024-03-08

## 2024-03-06 RX ORDER — ACETAMINOPHEN 325 MG/1
650 TABLET ORAL
Status: CANCELLED | OUTPATIENT
Start: 2024-03-06

## 2024-03-06 RX ORDER — SODIUM CHLORIDE 9 MG/ML
20 INJECTION, SOLUTION INTRAVENOUS CONTINUOUS
Status: CANCELLED | OUTPATIENT
Start: 2024-03-08

## 2024-03-06 RX ORDER — SODIUM CHLORIDE 9 MG/ML
INJECTION, SOLUTION INTRAVENOUS CONTINUOUS
Status: CANCELLED | OUTPATIENT
Start: 2024-03-08

## 2024-03-06 RX ORDER — ALBUTEROL SULFATE 90 UG/1
4 AEROSOL, METERED RESPIRATORY (INHALATION) PRN
Status: CANCELLED | OUTPATIENT
Start: 2024-03-08

## 2024-03-06 RX ORDER — ALBUTEROL SULFATE 90 UG/1
4 AEROSOL, METERED RESPIRATORY (INHALATION) PRN
Status: CANCELLED | OUTPATIENT
Start: 2024-03-06

## 2024-03-06 RX ORDER — ONDANSETRON 2 MG/ML
8 INJECTION INTRAMUSCULAR; INTRAVENOUS
Status: CANCELLED | OUTPATIENT
Start: 2024-03-06

## 2024-03-06 RX ORDER — SODIUM CHLORIDE 9 MG/ML
20 INJECTION, SOLUTION INTRAVENOUS CONTINUOUS
Status: CANCELLED | OUTPATIENT
Start: 2024-03-06

## 2024-03-06 RX ORDER — ACETAMINOPHEN 325 MG/1
650 TABLET ORAL
Status: CANCELLED | OUTPATIENT
Start: 2024-03-08

## 2024-03-06 RX ORDER — EPINEPHRINE 1 MG/ML
0.3 INJECTION, SOLUTION, CONCENTRATE INTRAVENOUS PRN
Status: CANCELLED | OUTPATIENT
Start: 2024-03-08

## 2024-03-06 RX ORDER — DIPHENHYDRAMINE HCL 25 MG
25 CAPSULE ORAL ONCE
Status: CANCELLED | OUTPATIENT
Start: 2024-03-06 | End: 2024-03-06

## 2024-03-06 RX ORDER — ACETAMINOPHEN 325 MG/1
650 TABLET ORAL ONCE
Status: CANCELLED | OUTPATIENT
Start: 2024-03-06 | End: 2024-03-06

## 2024-03-06 RX ORDER — SODIUM CHLORIDE 0.9 % (FLUSH) 0.9 %
5-40 SYRINGE (ML) INJECTION PRN
Status: CANCELLED | OUTPATIENT
Start: 2024-03-06

## 2024-03-06 RX ORDER — ONDANSETRON 2 MG/ML
8 INJECTION INTRAMUSCULAR; INTRAVENOUS
Status: CANCELLED | OUTPATIENT
Start: 2024-03-08

## 2024-03-06 NOTE — PROGRESS NOTES
[facility] [place]  at [facility]  [address], [address], [address]  [place] [address]  [phone]    Hematology Note        Patient: Esther Kaiser MRN: [id number]  SSN: [id number]    YOB: 1935  Age: 88 y.o.  Sex: female      Subjective:      Esther Kaiser is a 88 y.o. female who I am seeing for anemia. She has been experiencing progressive dyspnea. Anemia has worsened over the last few months. She has received RBC transfusion which improved the Hgb but it continues to drift downwards. She is fatigued. 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 She is on Momelatinib for two weeks. She is experiencing some diarrhea which managed by Immodium. Some orthostasis.       Review of Systems:  Constitutional: positive for fatigue  Eyes: negative  Ears, Nose, Mouth, Throat, and Face: negative  Respiratory: negative  Cardiovascular: negative  Gastrointestinal: negative  Genitourinary:negative  Integument/Breast: negative  Hematologic/Lymphatic: negative  Musculoskeletal:negative  Neurological: negative      No past medical history on file.  No past surgical history on file.   No family history on file.  Social History     Tobacco Use    Smoking status: Never    Smokeless tobacco: Never   Substance Use Topics    Alcohol use: Not Currently      Prior to Admission medications    Medication Sig Start Date End Date Taking? Authorizing Provider   Momelotinib Dihydrochloride (OJJAARA) 200 MG TABS Take 200 mg by mouth daily 2/9/24  Yes Roberta Oliver, APRN - NP   montelukast (SINGULAIR) 10 MG tablet Take 1 tablet by mouth nightly 8/5/23  Yes ProviderDelores MD   atorvastatin (LIPITOR) 10 MG tablet Take 1 tablet by mouth daily 1/10/24  Yes Messi Stock MD   pantoprazole (PROTONIX) 40 MG tablet Take 1 tablet by mouth in the morning and at bedtime 12/5/23  Yes Messi Stock MD   Travoprost, MAGY Free, (TRAVATAN Z) 0.004 % SOLN ophthalmic

## 2024-03-06 NOTE — PROGRESS NOTES
Outpatient Infusion Center Progress Note    0800  Pt arrived in stable condition and in no distress for labs.  Labs obtained from right AC per order and sent for processing.    Recent Results (from the past 12 hour(s))   CBC with Auto Differential    Collection Time: 03/06/24  8:04 AM   Result Value Ref Range    WBC 3.1 (L) 3.6 - 11.0 K/uL    RBC 2.68 (L) 3.80 - 5.20 M/uL    Hemoglobin 7.4 (L) 11.5 - 16.0 g/dL    Hematocrit 23.7 (L) 35.0 - 47.0 %    MCV 88.4 80.0 - 99.0 FL    MCH 27.6 26.0 - 34.0 PG    MCHC 31.2 30.0 - 36.5 g/dL    RDW 16.6 (H) 11.5 - 14.5 %    Platelets 61 (L) 150 - 400 K/uL    MPV 10.0 8.9 - 12.9 FL    Nucleated RBCs 2.0 (H) 0  WBC    nRBC 0.06 (H) 0.00 - 0.01 K/uL    Neutrophils % PENDING %    Lymphocytes % PENDING %    Monocytes % PENDING %    Eosinophils % PENDING %    Basophils % PENDING %    Immature Granulocytes PENDING %    Neutrophils Absolute PENDING K/UL    Lymphocytes Absolute PENDING K/UL    Monocytes Absolute PENDING K/UL    Eosinophils Absolute PENDING K/UL    Basophils Absolute PENDING K/UL    Absolute Immature Granulocyte PENDING K/UL    Differential Type PENDING       0810  Pt discharged in no acute distress. Next appointment:    Future Appointments   Date Time Provider Department Center   3/13/2024  8:00 AM VALENTIN FASTRACK 6 [facility]   3/20/2024  8:00 AM VALENTIN FASTRACK 5 [facility]   3/22/2024  9:30 AM VALENTIN LONG3 [place] [facility]   3/27/2024  8:00 AM VALENTIN FASTRACK 2 [facility]   4/3/2024  8:30 AM VALENTIN FASTRACK 5 [facility]   4/10/2024  8:00 AM VALENTIN FASTRACK 2 [facility]   4/12/2024  9:30 AM VALENTIN LONG3 [place] [facility]   4/17/2024  8:00 AM VALENTIN FASTRACK 5 [facility]   4/24/2024  8:00 AM VALENTIN FASTRACK 3 [facility]   5/1/2024  8:30 AM [facility] 4 [facility]   5/3/2024  9:30 AM VALENTIN MED4 [place] [facility]   5/8/2024  8:00 AM [facility] 4 [facility]   5/15/2024  8:00 AM [facility]ACK 3 [facility]   5/22/2024  8:00 AM VALENTIN FASTRACK 2 [facility]

## 2024-03-08 ENCOUNTER — HOSPITAL ENCOUNTER (OUTPATIENT)
Facility: HOSPITAL | Age: 89
Setting detail: INFUSION SERIES
Discharge: HOME | End: 2024-03-08
Payer: MEDICARE

## 2024-03-08 VITALS
RESPIRATION RATE: 16 BRPM | SYSTOLIC BLOOD PRESSURE: 147 MMHG | HEART RATE: 64 BPM | TEMPERATURE: 98.1 F | DIASTOLIC BLOOD PRESSURE: 56 MMHG | OXYGEN SATURATION: 100 %

## 2024-03-08 DIAGNOSIS — D46.4 REFRACTORY ANEMIA DUE TO MYELODYSPLASTIC SYNDROME (HCC): Primary | ICD-10-CM

## 2024-03-08 PROCEDURE — 36430 TRANSFUSION BLD/BLD COMPNT: CPT

## 2024-03-08 PROCEDURE — P9016 RBC LEUKOCYTES REDUCED: HCPCS

## 2024-03-08 RX ORDER — ONDANSETRON 2 MG/ML
8 INJECTION INTRAMUSCULAR; INTRAVENOUS
OUTPATIENT
Start: 2024-03-08

## 2024-03-08 RX ORDER — DIPHENHYDRAMINE HYDROCHLORIDE 50 MG/ML
50 INJECTION INTRAMUSCULAR; INTRAVENOUS
OUTPATIENT
Start: 2024-03-08

## 2024-03-08 RX ORDER — SODIUM CHLORIDE 9 MG/ML
20 INJECTION, SOLUTION INTRAVENOUS CONTINUOUS
Status: CANCELLED | OUTPATIENT
Start: 2024-03-08

## 2024-03-08 RX ORDER — ALBUTEROL SULFATE 90 UG/1
4 AEROSOL, METERED RESPIRATORY (INHALATION) PRN
OUTPATIENT
Start: 2024-03-08

## 2024-03-08 RX ORDER — SODIUM CHLORIDE 0.9 % (FLUSH) 0.9 %
5-40 SYRINGE (ML) INJECTION PRN
Status: DISCONTINUED | OUTPATIENT
Start: 2024-03-08 | End: 2024-03-09 | Stop reason: HOSPADM

## 2024-03-08 RX ORDER — SODIUM CHLORIDE 9 MG/ML
25 INJECTION, SOLUTION INTRAVENOUS PRN
Status: DISCONTINUED | OUTPATIENT
Start: 2024-03-08 | End: 2024-03-09 | Stop reason: HOSPADM

## 2024-03-08 RX ORDER — SODIUM CHLORIDE 9 MG/ML
INJECTION, SOLUTION INTRAVENOUS CONTINUOUS
Status: DISCONTINUED | OUTPATIENT
Start: 2024-03-08 | End: 2024-03-09 | Stop reason: HOSPADM

## 2024-03-08 RX ORDER — SODIUM CHLORIDE 9 MG/ML
25 INJECTION, SOLUTION INTRAVENOUS PRN
Status: CANCELLED | OUTPATIENT
Start: 2024-03-08

## 2024-03-08 RX ORDER — SODIUM CHLORIDE 0.9 % (FLUSH) 0.9 %
5-40 SYRINGE (ML) INJECTION PRN
Status: CANCELLED | OUTPATIENT
Start: 2024-03-08

## 2024-03-08 RX ORDER — ACETAMINOPHEN 325 MG/1
650 TABLET ORAL
OUTPATIENT
Start: 2024-03-08

## 2024-03-08 RX ORDER — SODIUM CHLORIDE 9 MG/ML
INJECTION, SOLUTION INTRAVENOUS CONTINUOUS
OUTPATIENT
Start: 2024-03-08

## 2024-03-08 RX ORDER — EPINEPHRINE 1 MG/ML
0.3 INJECTION, SOLUTION INTRAMUSCULAR; SUBCUTANEOUS PRN
OUTPATIENT
Start: 2024-03-08

## 2024-03-08 RX ORDER — SODIUM CHLORIDE 9 MG/ML
20 INJECTION, SOLUTION INTRAVENOUS CONTINUOUS
Status: DISCONTINUED | OUTPATIENT
Start: 2024-03-08 | End: 2024-03-09 | Stop reason: HOSPADM

## 2024-03-08 NOTE — PROGRESS NOTES
[facility] Progress Note    Date: 2024    Name: Esther Kaiser    MRN: [id number]         : 1935      Pt arrived at [facility] in no distress for transfusion of 1 units PRBCs.      Follow Up: Proceed with treatment    Assessment completed, no new complaints voiced.      Lines:   RIGHT PIC (FOREARM)    Education provided regarding reason for PRBC transfusion and possible reactions.  All questions and concerns regarding PRBC transfusion answered, patient voiced understanding.    1015:  1st unit PRBCs started and infusing without difficulty, observed x 15 minutes  1246:  1st unit completed without adverse reaction noted, NS flushing line.    Patient Vitals for the past 12 hrs:   Temp Pulse Resp BP SpO2   24 1258 98.1 °F (36.7 °C) -- 16 (!) 147/56 100 %   24 1030 97.6 °F (36.4 °C) 64 16 (!) 132/56 100 %   24 1000 98.6 °F (37 °C) 57 18 (!) 136/49 100 %       Tolerated transfusion  well, no adverse reaction noted.  D/C instructions reviewed, copy to pt, voiced understanding.  Patient declined 1 hour post transfusion observation/vital signs.      D/Cd from [facility] in no distress accompanied by daughter.      Patient provided with AVS , which includes future appointment and written educational material.     Future Appointments   Date Time Provider Department Center   3/13/2024  8:00 AM [facility]STRACK 6 [facility]   3[facility]20[facility]2024  [facility]   3/22/2024  9:30 AM [facility] LONG3 [place] [facility]   3/27/2024  8:00 AM [facility]ACK 2 [facility]   4/3/2024  8:30 AM [facility] 5 [facility]   4/10/2024  8:00 AM [facility] 2 [facility]   2024  9:30 AM [facility] LONG3 [place] [facility]   2024  8:00 AM [facility] 5 [facility]   2024  8:00 AM [facility] 3 [facility]   2024  8[facility]   5/3/2024  9:30 [facility]   2024  8[facility]00 [facility]   5/15/2024  8:00 AM [facility]IN FASTRACK 3 [facility]   2024  8:00 AM VALENTIN FASTRACK 2  [facility]         Sloane Singh RN  March 8, 2024

## 2024-03-09 LAB
ABO + RH BLD: NORMAL
BLD PROD TYP BPU: NORMAL
BLD PROD TYP BPU: NORMAL
BLOOD BANK BLOOD PRODUCT EXPIRATION DATE: NORMAL
BLOOD BANK DISPENSE STATUS: NORMAL
BLOOD BANK DISPENSE STATUS: NORMAL
BLOOD BANK ISBT PRODUCT BLOOD TYPE: 7300
BLOOD BANK UNIT TYPE AND RH: NORMAL
BLOOD GROUP ANTIBODIES SERPL: NORMAL
BPU ID: NORMAL
BPU ID: NORMAL
CROSSMATCH RESULT: NORMAL
CROSSMATCH RESULT: NORMAL
SPECIMEN EXP DATE BLD: NORMAL
UNIT DIVISION: 0
UNIT DIVISION: 0
UNIT ISSUE DATE/TIME: NORMAL

## 2024-03-11 ENCOUNTER — APPOINTMENT (OUTPATIENT)
Facility: HOSPITAL | Age: 89
End: 2024-03-11
Payer: MEDICARE

## 2024-03-13 ENCOUNTER — HOSPITAL ENCOUNTER (OUTPATIENT)
Facility: HOSPITAL | Age: 89
Setting detail: INFUSION SERIES
Discharge: HOME | End: 2024-03-13
Payer: MEDICARE

## 2024-03-13 DIAGNOSIS — D46.9 MDS (MYELODYSPLASTIC SYNDROME) (HCC): ICD-10-CM

## 2024-03-13 DIAGNOSIS — D46.4 REFRACTORY ANEMIA DUE TO MYELODYSPLASTIC SYNDROME (HCC): ICD-10-CM

## 2024-03-13 LAB
BASOPHILS # BLD: 0 K/UL (ref 0–0.1)
BASOPHILS NFR BLD: 0 % (ref 0–1)
DIFFERENTIAL METHOD BLD: ABNORMAL
EOSINOPHIL # BLD: 0 K/UL (ref 0–0.4)
EOSINOPHIL NFR BLD: 0 % (ref 0–7)
ERYTHROCYTE [DISTWIDTH] IN BLOOD BY AUTOMATED COUNT: 15.7 % (ref 11.5–14.5)
HCT VFR BLD AUTO: 28.9 % (ref 35–47)
HGB BLD-MCNC: 9.1 G/DL (ref 11.5–16)
IMM GRANULOCYTES # BLD AUTO: 0 K/UL (ref 0–0.04)
IMM GRANULOCYTES NFR BLD AUTO: 0 % (ref 0–0.5)
LYMPHOCYTES # BLD: 1.1 K/UL (ref 0.8–3.5)
LYMPHOCYTES NFR BLD: 40 % (ref 12–49)
MCH RBC QN AUTO: 27.8 PG (ref 26–34)
MCHC RBC AUTO-ENTMCNC: 31.5 G/DL (ref 30–36.5)
MCV RBC AUTO: 88.4 FL (ref 80–99)
MONOCYTES # BLD: 0.1 K/UL (ref 0–1)
MONOCYTES NFR BLD: 2 % (ref 5–13)
NEUTS SEG # BLD: 1.5 K/UL (ref 1.8–8)
NEUTS SEG NFR BLD: 58 % (ref 32–75)
NRBC # BLD: 0.05 K/UL (ref 0–0.01)
NRBC BLD-RTO: 1.8 PER 100 WBC
PLATELET # BLD AUTO: 58 K/UL (ref 150–400)
RBC # BLD AUTO: 3.27 M/UL (ref 3.8–5.2)
RBC MORPH BLD: ABNORMAL
WBC # BLD AUTO: 2.7 K/UL (ref 3.6–11)

## 2024-03-13 PROCEDURE — 85025 COMPLETE CBC W/AUTO DIFF WBC: CPT

## 2024-03-13 PROCEDURE — 36415 COLL VENOUS BLD VENIPUNCTURE: CPT

## 2024-03-13 NOTE — PROGRESS NOTES
[facility] Lab Draw Note:  Arrived - 0810    Labs drawn peripherally from L arm - CBC, SBB      0815 - Tolerated well.  Pt denies any acute problems/changes. Discharged from hospitals ambulatory. No distress. Next appt: 3/20/24 at 0800       See EPIC for pending lab results.

## 2024-03-14 ENCOUNTER — OFFICE VISIT (OUTPATIENT)
Age: 89
End: 2024-03-14
Payer: MEDICARE

## 2024-03-14 VITALS
TEMPERATURE: 98.3 F | DIASTOLIC BLOOD PRESSURE: 76 MMHG | RESPIRATION RATE: 16 BRPM | BODY MASS INDEX: 21.9 KG/M2 | WEIGHT: 119 LBS | OXYGEN SATURATION: 96 % | HEIGHT: 62 IN | HEART RATE: 80 BPM | SYSTOLIC BLOOD PRESSURE: 133 MMHG

## 2024-03-14 DIAGNOSIS — T14.8XXA HEMATOMA: ICD-10-CM

## 2024-03-14 DIAGNOSIS — H61.21 IMPACTED CERUMEN OF RIGHT EAR: Primary | ICD-10-CM

## 2024-03-14 DIAGNOSIS — D46.9 MDS (MYELODYSPLASTIC SYNDROME) (HCC): ICD-10-CM

## 2024-03-14 PROCEDURE — 99213 OFFICE O/P EST LOW 20 MIN: CPT | Performed by: PHYSICIAN ASSISTANT

## 2024-03-14 PROCEDURE — 1123F ACP DISCUSS/DSCN MKR DOCD: CPT | Performed by: PHYSICIAN ASSISTANT

## 2024-03-14 SDOH — ECONOMIC STABILITY: FOOD INSECURITY: WITHIN THE PAST 12 MONTHS, YOU WORRIED THAT YOUR FOOD WOULD RUN OUT BEFORE YOU GOT MONEY TO BUY MORE.: NEVER TRUE

## 2024-03-14 SDOH — ECONOMIC STABILITY: INCOME INSECURITY: HOW HARD IS IT FOR YOU TO PAY FOR THE VERY BASICS LIKE FOOD, HOUSING, MEDICAL CARE, AND HEATING?: NOT HARD AT ALL

## 2024-03-14 SDOH — ECONOMIC STABILITY: FOOD INSECURITY: WITHIN THE PAST 12 MONTHS, THE FOOD YOU BOUGHT JUST DIDN'T LAST AND YOU DIDN'T HAVE MONEY TO GET MORE.: NEVER TRUE

## 2024-03-14 ASSESSMENT — PATIENT HEALTH QUESTIONNAIRE - PHQ9
SUM OF ALL RESPONSES TO PHQ QUESTIONS 1-9: 0
1. LITTLE INTEREST OR PLEASURE IN DOING THINGS: 0
2. FEELING DOWN, DEPRESSED OR HOPELESS: 0
SUM OF ALL RESPONSES TO PHQ QUESTIONS 1-9: 0
SUM OF ALL RESPONSES TO PHQ QUESTIONS 1-9: 0
SUM OF ALL RESPONSES TO PHQ9 QUESTIONS 1 & 2: 0
SUM OF ALL RESPONSES TO PHQ QUESTIONS 1-9: 0

## 2024-03-14 NOTE — PATIENT INSTRUCTIONS
Use Debrox 4 drops in the right ear twice a day for 5 days.    Lay on left side with right ear up for 10 minutes to let gravity help the drops penetrate the ear wax.    Return for us to flush the ear on day 6.    Do not use Qtips    Use warm compresses to the bruising on your lower leg

## 2024-03-14 NOTE — PROGRESS NOTES
Chief Complaint   Patient presents with    Otalgia     Right ear x 1 week    Leg Pain     Bruising on Left leg.          Health Maintenance Due   Topic Date Due    COVID-19 Vaccine (1) Never done    DTaP/Tdap/Td vaccine (1 - Tdap) Never done    Shingles vaccine (1 of 2) Never done    Respiratory Syncytial Virus (RSV) Pregnant or age 60 yrs+ (1 - 1-dose 60+ series) Never done    Pneumococcal 65+ years Vaccine (1 - PCV) Never done    Annual Wellness Visit (Medicare Advantage)  Never done         \"Have you been to the ER, urgent care clinic since your last visit?  Hospitalized since your last visit?\"    Yes for blood loss    “Have you seen or consulted any other health care providers outside of [facility] since your last visit?”    Oncology, infusion center           
breathing   Extremities: No edema or cyanosis   Skin: Left proximal pretibial area are 2 superficial walnut-sized hematomas with central purple ecchymosis with surrounding yellow-green with tenderness  No leg swelling, erythema  No calf tenderness          Lab Results   Component Value Date    WBC 2.7 (L) 03/13/2024    HGB 9.1 (L) 03/13/2024    HCT 28.9 (L) 03/13/2024    MCV 88.4 03/13/2024    PLT 58 (L) 03/13/2024         Assessment/Plan:       ICD-10-CM    1. Impacted cerumen of right ear  H61.21 EAR CERUMEN REMOVAL      2. Hematoma  T14.8XXA       3. MDS (myelodysplastic syndrome) (HCC)  D46.9           Right ear with cerumen impaction  Ear lavage was attempted without success and she started to have discomfort with the procedure so it was discontinued  EAC visualized afterwards and no erythema or injury and impaction remain  She is to use Debrox 4 drops in the ear twice a day for 5 days and return on day 6 for flushing again    Hematoma left lower leg, very small, in setting of myelodysplastic syndrome and low platelets  No clinical signs of DVT  May apply warm compresses as needed and follow-up with oncology as scheduled          Verbal and written instructions (see AVS) provided.  Patient expresses understanding of diagnosis and treatment plan.

## 2024-03-20 ENCOUNTER — HOSPITAL ENCOUNTER (OUTPATIENT)
Facility: HOSPITAL | Age: 89
Setting detail: INFUSION SERIES
Discharge: HOME | End: 2024-03-20
Payer: MEDICARE

## 2024-03-20 DIAGNOSIS — D46.9 MDS (MYELODYSPLASTIC SYNDROME) (HCC): ICD-10-CM

## 2024-03-20 DIAGNOSIS — D46.4 REFRACTORY ANEMIA DUE TO MYELODYSPLASTIC SYNDROME (HCC): ICD-10-CM

## 2024-03-20 DIAGNOSIS — D46.4 REFRACTORY ANEMIA DUE TO MYELODYSPLASTIC SYNDROME (HCC): Primary | ICD-10-CM

## 2024-03-20 LAB
BASOPHILS # BLD: 0 K/UL (ref 0–0.1)
BASOPHILS NFR BLD: 0 % (ref 0–1)
DIFFERENTIAL METHOD BLD: ABNORMAL
EOSINOPHIL # BLD: 0 K/UL (ref 0–0.4)
EOSINOPHIL NFR BLD: 2 % (ref 0–7)
ERYTHROCYTE [DISTWIDTH] IN BLOOD BY AUTOMATED COUNT: 15.8 % (ref 11.5–14.5)
HCT VFR BLD AUTO: 21.4 % (ref 35–47)
HGB BLD-MCNC: 6.8 G/DL (ref 11.5–16)
HISTORY CHECK: NORMAL
IMM GRANULOCYTES # BLD AUTO: 0 K/UL (ref 0–0.04)
IMM GRANULOCYTES NFR BLD AUTO: 0 % (ref 0–0.5)
LYMPHOCYTES # BLD: 0.8 K/UL (ref 0.8–3.5)
LYMPHOCYTES NFR BLD: 34 % (ref 12–49)
MCH RBC QN AUTO: 28 PG (ref 26–34)
MCHC RBC AUTO-ENTMCNC: 31.8 G/DL (ref 30–36.5)
MCV RBC AUTO: 88.1 FL (ref 80–99)
MONOCYTES # BLD: 0 K/UL (ref 0–1)
MONOCYTES NFR BLD: 0 % (ref 5–13)
NEUTS SEG # BLD: 1.6 K/UL (ref 1.8–8)
NEUTS SEG NFR BLD: 64 % (ref 32–75)
NRBC # BLD: 0.04 K/UL (ref 0–0.01)
NRBC BLD-RTO: 1.7 PER 100 WBC
PLATELET # BLD AUTO: 86 K/UL (ref 150–400)
PMV BLD AUTO: 10.6 FL (ref 8.9–12.9)
RBC # BLD AUTO: 2.43 M/UL (ref 3.8–5.2)
RBC MORPH BLD: ABNORMAL
WBC # BLD AUTO: 2.4 K/UL (ref 3.6–11)
WBC MORPH BLD: ABNORMAL

## 2024-03-20 PROCEDURE — 86901 BLOOD TYPING SEROLOGIC RH(D): CPT

## 2024-03-20 PROCEDURE — 85025 COMPLETE CBC W/AUTO DIFF WBC: CPT

## 2024-03-20 PROCEDURE — 36415 COLL VENOUS BLD VENIPUNCTURE: CPT

## 2024-03-20 PROCEDURE — 86900 BLOOD TYPING SEROLOGIC ABO: CPT

## 2024-03-20 PROCEDURE — 86850 RBC ANTIBODY SCREEN: CPT

## 2024-03-20 PROCEDURE — 86923 COMPATIBILITY TEST ELECTRIC: CPT

## 2024-03-20 RX ORDER — ACETAMINOPHEN 325 MG/1
650 TABLET ORAL
Status: CANCELLED | OUTPATIENT
Start: 2024-03-20

## 2024-03-20 RX ORDER — DIPHENHYDRAMINE HCL 25 MG
25 TABLET ORAL ONCE
Status: CANCELLED | OUTPATIENT
Start: 2024-03-20 | End: 2024-03-20

## 2024-03-20 RX ORDER — EPINEPHRINE 1 MG/ML
0.3 INJECTION, SOLUTION, CONCENTRATE INTRAVENOUS PRN
Status: CANCELLED | OUTPATIENT
Start: 2024-03-20

## 2024-03-20 RX ORDER — SODIUM CHLORIDE 9 MG/ML
20 INJECTION, SOLUTION INTRAVENOUS CONTINUOUS
Status: CANCELLED | OUTPATIENT
Start: 2024-03-20

## 2024-03-20 RX ORDER — ACETAMINOPHEN 325 MG/1
650 TABLET ORAL ONCE
Status: CANCELLED | OUTPATIENT
Start: 2024-03-20 | End: 2024-03-20

## 2024-03-20 RX ORDER — DIPHENHYDRAMINE HYDROCHLORIDE 50 MG/ML
50 INJECTION INTRAMUSCULAR; INTRAVENOUS
Status: CANCELLED | OUTPATIENT
Start: 2024-03-20

## 2024-03-20 RX ORDER — SODIUM CHLORIDE 9 MG/ML
25 INJECTION, SOLUTION INTRAVENOUS PRN
Status: CANCELLED | OUTPATIENT
Start: 2024-03-20

## 2024-03-20 RX ORDER — ALBUTEROL SULFATE 90 UG/1
4 AEROSOL, METERED RESPIRATORY (INHALATION) PRN
Status: CANCELLED | OUTPATIENT
Start: 2024-03-20

## 2024-03-20 RX ORDER — ONDANSETRON 2 MG/ML
8 INJECTION INTRAMUSCULAR; INTRAVENOUS
Status: CANCELLED | OUTPATIENT
Start: 2024-03-20

## 2024-03-20 RX ORDER — SODIUM CHLORIDE 0.9 % (FLUSH) 0.9 %
5-40 SYRINGE (ML) INJECTION PRN
Status: CANCELLED | OUTPATIENT
Start: 2024-03-20

## 2024-03-20 RX ORDER — SODIUM CHLORIDE 9 MG/ML
INJECTION, SOLUTION INTRAVENOUS CONTINUOUS
Status: CANCELLED | OUTPATIENT
Start: 2024-03-20

## 2024-03-20 NOTE — PROGRESS NOTES
[facility] Lab Draw Note:    Labs drawn peripherally from right arm - CBC w/ diff, Extra tube to blood bank    Tolerated well.  Pt denies any acute problems/changes. Discharged from \[facility]\"" ambulatory. No distress. Next appt: 3/22/24       See EPIC for pending lab results.

## 2024-03-22 ENCOUNTER — HOSPITAL ENCOUNTER (OUTPATIENT)
Facility: HOSPITAL | Age: 89
Setting detail: INFUSION SERIES
Discharge: HOME | End: 2024-03-22
Payer: MEDICARE

## 2024-03-22 VITALS
HEART RATE: 78 BPM | TEMPERATURE: 98.1 F | SYSTOLIC BLOOD PRESSURE: 113 MMHG | DIASTOLIC BLOOD PRESSURE: 60 MMHG | OXYGEN SATURATION: 99 % | RESPIRATION RATE: 16 BRPM

## 2024-03-22 DIAGNOSIS — D46.4 REFRACTORY ANEMIA DUE TO MYELODYSPLASTIC SYNDROME (HCC): Primary | ICD-10-CM

## 2024-03-22 PROCEDURE — 2580000003 HC RX 258: Performed by: NURSE PRACTITIONER

## 2024-03-22 PROCEDURE — 36430 TRANSFUSION BLD/BLD COMPNT: CPT

## 2024-03-22 PROCEDURE — 6370000000 HC RX 637 (ALT 250 FOR IP): Performed by: NURSE PRACTITIONER

## 2024-03-22 PROCEDURE — P9016 RBC LEUKOCYTES REDUCED: HCPCS

## 2024-03-22 RX ORDER — SODIUM CHLORIDE 0.9 % (FLUSH) 0.9 %
5-40 SYRINGE (ML) INJECTION PRN
Status: DISCONTINUED | OUTPATIENT
Start: 2024-03-22 | End: 2024-03-23 | Stop reason: HOSPADM

## 2024-03-22 RX ORDER — SODIUM CHLORIDE 9 MG/ML
INJECTION, SOLUTION INTRAVENOUS CONTINUOUS
Status: CANCELLED | OUTPATIENT
Start: 2024-03-22

## 2024-03-22 RX ORDER — EPINEPHRINE 1 MG/ML
0.3 INJECTION, SOLUTION INTRAMUSCULAR; SUBCUTANEOUS PRN
Status: CANCELLED | OUTPATIENT
Start: 2024-03-22

## 2024-03-22 RX ORDER — DIPHENHYDRAMINE HCL 25 MG
25 CAPSULE ORAL ONCE
Status: CANCELLED | OUTPATIENT
Start: 2024-03-22 | End: 2024-03-22

## 2024-03-22 RX ORDER — SODIUM CHLORIDE 0.9 % (FLUSH) 0.9 %
5-40 SYRINGE (ML) INJECTION PRN
Status: CANCELLED | OUTPATIENT
Start: 2024-03-22

## 2024-03-22 RX ORDER — DIPHENHYDRAMINE HYDROCHLORIDE 50 MG/ML
50 INJECTION INTRAMUSCULAR; INTRAVENOUS
Status: CANCELLED | OUTPATIENT
Start: 2024-03-22

## 2024-03-22 RX ORDER — ACETAMINOPHEN 325 MG/1
650 TABLET ORAL
Status: CANCELLED | OUTPATIENT
Start: 2024-03-22

## 2024-03-22 RX ORDER — DIPHENHYDRAMINE HCL 25 MG
25 CAPSULE ORAL ONCE
Status: COMPLETED | OUTPATIENT
Start: 2024-03-22 | End: 2024-03-22

## 2024-03-22 RX ORDER — ACETAMINOPHEN 325 MG/1
650 TABLET ORAL ONCE
Status: COMPLETED | OUTPATIENT
Start: 2024-03-22 | End: 2024-03-22

## 2024-03-22 RX ORDER — SODIUM CHLORIDE 9 MG/ML
20 INJECTION, SOLUTION INTRAVENOUS CONTINUOUS
Status: DISCONTINUED | OUTPATIENT
Start: 2024-03-22 | End: 2024-03-23 | Stop reason: HOSPADM

## 2024-03-22 RX ORDER — SODIUM CHLORIDE 9 MG/ML
25 INJECTION, SOLUTION INTRAVENOUS PRN
Status: DISCONTINUED | OUTPATIENT
Start: 2024-03-22 | End: 2024-03-23 | Stop reason: HOSPADM

## 2024-03-22 RX ORDER — ALBUTEROL SULFATE 90 UG/1
4 AEROSOL, METERED RESPIRATORY (INHALATION) PRN
Status: CANCELLED | OUTPATIENT
Start: 2024-03-22

## 2024-03-22 RX ORDER — ONDANSETRON 2 MG/ML
8 INJECTION INTRAMUSCULAR; INTRAVENOUS
Status: CANCELLED | OUTPATIENT
Start: 2024-03-22

## 2024-03-22 RX ORDER — SODIUM CHLORIDE 9 MG/ML
20 INJECTION, SOLUTION INTRAVENOUS CONTINUOUS
Status: CANCELLED | OUTPATIENT
Start: 2024-03-22

## 2024-03-22 RX ORDER — ACETAMINOPHEN 325 MG/1
650 TABLET ORAL ONCE
Status: CANCELLED | OUTPATIENT
Start: 2024-03-22 | End: 2024-03-22

## 2024-03-22 RX ORDER — SODIUM CHLORIDE 9 MG/ML
25 INJECTION, SOLUTION INTRAVENOUS PRN
Status: CANCELLED | OUTPATIENT
Start: 2024-03-22

## 2024-03-22 RX ADMIN — SODIUM CHLORIDE 200 ML/HR: 9 INJECTION, SOLUTION INTRAVENOUS at 09:00

## 2024-03-22 RX ADMIN — ACETAMINOPHEN 650 MG: 325 TABLET ORAL at 09:56

## 2024-03-22 RX ADMIN — DIPHENHYDRAMINE HYDROCHLORIDE 25 MG: 25 CAPSULE ORAL at 09:56

## 2024-03-22 NOTE — PROGRESS NOTES
0850 Pt arrived at [facility] w/her daughter for transfusion of 1 unit PRBCs. Pt came out of bathroom feeling very lightheaded and weak. She was assisted to a seating position in her rollator, wheeled to the treatment area and placed in treatment chair. Pt fainted while in chair. Vitals were obtained and smelling ammonia was utilized. Pt awoke but stated everything was spinning. 22G PIV established in Right A/C without difficulty and NS was initiated at 200mL/hr. Pt was given orange juice and stated she was starting to feel better. She complained of fatigue and weakness as well as foul-smelling breath, stool, and urine, which she says is a side effect of her O[place]ja[place] pill. Pt also had scattered bruising and wounds on bilateral arms from a recent fall. Signs/symptoms of adverse blood reaction discussed with pt and daughter, who confirmed understanding.    Patient Vitals for the past 24 hrs:   BP Temp Temp src Pulse Resp SpO2   03/22/24 1310 113/60 -- -- 78 -- 99 %   03/22/24 1200 (!) 112/57 98.1 °F (36.7 °C) Temporal 70 16 97 %   03/22/24 1015 (!) 114/54 98.6 °F (37 °C) Temporal (!) 109 16 99 %   03/22/24 0949 (!) 114/59 98.2 °F (36.8 °C) Temporal 90 16 97 %   03/22/24 0855 -- -- -- 95 -- 100 %   03/22/24 0853 113/66 -- -- (!) 126 -- 100 %     Medications received:  NS @ KVO  Tylenol 650 mg po  Benadryl 25 mg po    1000:  1st unit PRBCs started and infusing without difficulty, observed x 15 minutes  1200:  1st unit completed without adverse reaction noted, NS flushing line.    Mrs. Kaiser tolerated transfusion well. No adverse reaction noted. D/C instructions reviewed with patient and daughter. Questions were encouraged and answered. Patient observed 1 hour post transfusion and D/Cd from [facility] via wheelchair and in no distress accompanied by daughter @ 1320.  Next appt 3/27/24.    Nicole Albright RN  03/22/24

## 2024-03-23 LAB
ABO + RH BLD: NORMAL
BLD PROD TYP BPU: NORMAL
BLOOD BANK BLOOD PRODUCT EXPIRATION DATE: NORMAL
BLOOD BANK DISPENSE STATUS: NORMAL
BLOOD BANK ISBT PRODUCT BLOOD TYPE: 7300
BLOOD BANK UNIT TYPE AND RH: NORMAL
BLOOD GROUP ANTIBODIES SERPL: NORMAL
BPU ID: NORMAL
CROSSMATCH RESULT: NORMAL
SPECIMEN EXP DATE BLD: NORMAL
UNIT DIVISION: 0
UNIT ISSUE DATE/TIME: NORMAL

## 2024-03-27 ENCOUNTER — HOSPITAL ENCOUNTER (OUTPATIENT)
Facility: HOSPITAL | Age: 89
Setting detail: INFUSION SERIES
Discharge: HOME | End: 2024-03-27
Payer: MEDICARE

## 2024-03-27 VITALS
OXYGEN SATURATION: 92 % | SYSTOLIC BLOOD PRESSURE: 96 MMHG | DIASTOLIC BLOOD PRESSURE: 61 MMHG | HEART RATE: 73 BPM | TEMPERATURE: 97.8 F | RESPIRATION RATE: 16 BRPM

## 2024-03-27 DIAGNOSIS — D46.4 REFRACTORY ANEMIA DUE TO MYELODYSPLASTIC SYNDROME (HCC): ICD-10-CM

## 2024-03-27 DIAGNOSIS — D46.9 MDS (MYELODYSPLASTIC SYNDROME) (HCC): Primary | ICD-10-CM

## 2024-03-27 LAB
BASOPHILS # BLD: 0 K/UL (ref 0–0.1)
BASOPHILS NFR BLD: 0 % (ref 0–1)
DIFFERENTIAL METHOD BLD: ABNORMAL
EOSINOPHIL # BLD: 0 K/UL (ref 0–0.4)
EOSINOPHIL NFR BLD: 0 % (ref 0–7)
ERYTHROCYTE [DISTWIDTH] IN BLOOD BY AUTOMATED COUNT: 15.9 % (ref 11.5–14.5)
HCT VFR BLD AUTO: 20 % (ref 35–47)
HGB BLD-MCNC: 6.4 G/DL (ref 11.5–16)
IMM GRANULOCYTES # BLD AUTO: 0 K/UL (ref 0–0.04)
IMM GRANULOCYTES NFR BLD AUTO: 0 % (ref 0–0.5)
LYMPHOCYTES # BLD: 0.9 K/UL (ref 0.8–3.5)
LYMPHOCYTES NFR BLD: 38 % (ref 12–49)
MCH RBC QN AUTO: 28.4 PG (ref 26–34)
MCHC RBC AUTO-ENTMCNC: 32 G/DL (ref 30–36.5)
MCV RBC AUTO: 88.9 FL (ref 80–99)
MONOCYTES # BLD: 0 K/UL (ref 0–1)
MONOCYTES NFR BLD: 2 % (ref 5–13)
NEUTS SEG # BLD: 1.4 K/UL (ref 1.8–8)
NEUTS SEG NFR BLD: 60 % (ref 32–75)
NRBC # BLD: 0.02 K/UL (ref 0–0.01)
NRBC BLD-RTO: 0.9 PER 100 WBC
PLATELET # BLD AUTO: 63 K/UL (ref 150–400)
RBC # BLD AUTO: 2.25 M/UL (ref 3.8–5.2)
RBC MORPH BLD: ABNORMAL
WBC # BLD AUTO: 2.3 K/UL (ref 3.6–11)

## 2024-03-27 PROCEDURE — 36415 COLL VENOUS BLD VENIPUNCTURE: CPT

## 2024-03-27 PROCEDURE — 86901 BLOOD TYPING SEROLOGIC RH(D): CPT

## 2024-03-27 PROCEDURE — 86850 RBC ANTIBODY SCREEN: CPT

## 2024-03-27 PROCEDURE — 86900 BLOOD TYPING SEROLOGIC ABO: CPT

## 2024-03-27 PROCEDURE — 85025 COMPLETE CBC W/AUTO DIFF WBC: CPT

## 2024-03-27 PROCEDURE — 86923 COMPATIBILITY TEST ELECTRIC: CPT

## 2024-03-27 NOTE — PROGRESS NOTES
[facility] Lab Draw Note:    Pt arrived for labs. Labs drawn peripherally from R arm - CBC and ETBB (labs pending at time of this note)    BP 96/61   Pulse 73   Temp 97.8 °F (36.6 °C) (Temporal)   Resp 16   LMP  (LMP Unknown)   SpO2 92%     Tolerated well.  Pt denies any acute problems/changes. Discharged from [facility] ambulatory. No distress. Next appointment's:  Future Appointments   Date Time Provider Department Center   4/3/2024  8:30 AM [facility] FASTRACK 5 [facility]   4/10/2024  8:00 AM [facility] FASTRACK 2 [facility]   4/12/2024  9:30 AM [facility] LONG3 [place] [facility]   4/17/2024  8:00 AM [facility] 5 [facility]   4/24/2024  8:00 AM [facility]ACK 7 [facility]   5/1/2024  8:30 AM [facility] FASTRACK 4 [facility]   5/3/2024  9:30 AM [facility] MED4 [facility]   5/8/2024  8:00 AM [facility] 4 [facility]   5/15/2024  8:00 AM [facility] 3 [facility]   5/22/2024  8:00 AM [facility] 2 [facility]

## 2024-03-28 LAB — HISTORY CHECK: NORMAL

## 2024-03-28 RX ORDER — SODIUM CHLORIDE 9 MG/ML
INJECTION, SOLUTION INTRAVENOUS CONTINUOUS
Status: CANCELLED | OUTPATIENT
Start: 2024-03-29

## 2024-03-28 RX ORDER — ACETAMINOPHEN 325 MG/1
650 TABLET ORAL ONCE
Status: CANCELLED | OUTPATIENT
Start: 2024-03-29 | End: 2024-03-29

## 2024-03-28 RX ORDER — DIPHENHYDRAMINE HYDROCHLORIDE 50 MG/ML
50 INJECTION INTRAMUSCULAR; INTRAVENOUS
OUTPATIENT
Start: 2024-03-28

## 2024-03-28 RX ORDER — ACETAMINOPHEN 325 MG/1
650 TABLET ORAL
OUTPATIENT
Start: 2024-03-28

## 2024-03-28 RX ORDER — SODIUM CHLORIDE 9 MG/ML
25 INJECTION, SOLUTION INTRAVENOUS PRN
Status: CANCELLED | OUTPATIENT
Start: 2024-03-29

## 2024-03-28 RX ORDER — SODIUM CHLORIDE 9 MG/ML
25 INJECTION, SOLUTION INTRAVENOUS PRN
OUTPATIENT
Start: 2024-03-28

## 2024-03-28 RX ORDER — ONDANSETRON 2 MG/ML
8 INJECTION INTRAMUSCULAR; INTRAVENOUS
OUTPATIENT
Start: 2024-03-28

## 2024-03-28 RX ORDER — ACETAMINOPHEN 325 MG/1
650 TABLET ORAL ONCE
OUTPATIENT
Start: 2024-03-28 | End: 2024-03-28

## 2024-03-28 RX ORDER — ALBUTEROL SULFATE 90 UG/1
4 AEROSOL, METERED RESPIRATORY (INHALATION) PRN
Status: CANCELLED | OUTPATIENT
Start: 2024-03-29

## 2024-03-28 RX ORDER — SODIUM CHLORIDE 9 MG/ML
INJECTION, SOLUTION INTRAVENOUS CONTINUOUS
OUTPATIENT
Start: 2024-03-28

## 2024-03-28 RX ORDER — EPINEPHRINE 1 MG/ML
0.3 INJECTION, SOLUTION, CONCENTRATE INTRAVENOUS PRN
Status: CANCELLED | OUTPATIENT
Start: 2024-03-29

## 2024-03-28 RX ORDER — SODIUM CHLORIDE 9 MG/ML
20 INJECTION, SOLUTION INTRAVENOUS CONTINUOUS
Status: CANCELLED | OUTPATIENT
Start: 2024-03-29

## 2024-03-28 RX ORDER — ACETAMINOPHEN 325 MG/1
650 TABLET ORAL
Status: CANCELLED | OUTPATIENT
Start: 2024-03-29

## 2024-03-28 RX ORDER — DIPHENHYDRAMINE HYDROCHLORIDE 50 MG/ML
50 INJECTION INTRAMUSCULAR; INTRAVENOUS
Status: CANCELLED | OUTPATIENT
Start: 2024-03-29

## 2024-03-28 RX ORDER — ONDANSETRON 2 MG/ML
8 INJECTION INTRAMUSCULAR; INTRAVENOUS
Status: CANCELLED | OUTPATIENT
Start: 2024-03-29

## 2024-03-28 RX ORDER — EPINEPHRINE 1 MG/ML
0.3 INJECTION, SOLUTION INTRAMUSCULAR; SUBCUTANEOUS PRN
OUTPATIENT
Start: 2024-03-28

## 2024-03-28 RX ORDER — SODIUM CHLORIDE 9 MG/ML
20 INJECTION, SOLUTION INTRAVENOUS CONTINUOUS
OUTPATIENT
Start: 2024-03-28

## 2024-03-28 RX ORDER — SODIUM CHLORIDE 0.9 % (FLUSH) 0.9 %
5-40 SYRINGE (ML) INJECTION PRN
Status: CANCELLED | OUTPATIENT
Start: 2024-03-29

## 2024-03-28 RX ORDER — SODIUM CHLORIDE 0.9 % (FLUSH) 0.9 %
5-40 SYRINGE (ML) INJECTION PRN
OUTPATIENT
Start: 2024-03-28

## 2024-03-28 RX ORDER — ALBUTEROL SULFATE 90 UG/1
4 AEROSOL, METERED RESPIRATORY (INHALATION) PRN
OUTPATIENT
Start: 2024-03-28

## 2024-03-28 RX ORDER — DIPHENHYDRAMINE HCL 25 MG
25 CAPSULE ORAL ONCE
OUTPATIENT
Start: 2024-03-28 | End: 2024-03-28

## 2024-03-28 RX ORDER — DIPHENHYDRAMINE HCL 25 MG
25 TABLET ORAL ONCE
Status: CANCELLED | OUTPATIENT
Start: 2024-03-29 | End: 2024-03-29

## 2024-03-29 ENCOUNTER — HOSPITAL ENCOUNTER (OUTPATIENT)
Facility: HOSPITAL | Age: 89
Setting detail: INFUSION SERIES
Discharge: HOME | End: 2024-03-29
Payer: MEDICARE

## 2024-03-29 VITALS
OXYGEN SATURATION: 100 % | RESPIRATION RATE: 16 BRPM | HEART RATE: 64 BPM | DIASTOLIC BLOOD PRESSURE: 64 MMHG | TEMPERATURE: 98.9 F | SYSTOLIC BLOOD PRESSURE: 121 MMHG

## 2024-03-29 DIAGNOSIS — D46.4 REFRACTORY ANEMIA DUE TO MYELODYSPLASTIC SYNDROME (HCC): Primary | ICD-10-CM

## 2024-03-29 PROCEDURE — 2580000003 HC RX 258: Performed by: NURSE PRACTITIONER

## 2024-03-29 PROCEDURE — P9016 RBC LEUKOCYTES REDUCED: HCPCS

## 2024-03-29 PROCEDURE — 36430 TRANSFUSION BLD/BLD COMPNT: CPT

## 2024-03-29 PROCEDURE — 6370000000 HC RX 637 (ALT 250 FOR IP): Performed by: NURSE PRACTITIONER

## 2024-03-29 RX ORDER — SODIUM CHLORIDE 0.9 % (FLUSH) 0.9 %
5-40 SYRINGE (ML) INJECTION PRN
Status: DISCONTINUED | OUTPATIENT
Start: 2024-03-29 | End: 2024-03-30 | Stop reason: HOSPADM

## 2024-03-29 RX ORDER — SODIUM CHLORIDE 9 MG/ML
20 INJECTION, SOLUTION INTRAVENOUS CONTINUOUS
Status: DISCONTINUED | OUTPATIENT
Start: 2024-03-29 | End: 2024-03-30 | Stop reason: HOSPADM

## 2024-03-29 RX ORDER — DIPHENHYDRAMINE HCL 25 MG
25 CAPSULE ORAL ONCE
Status: COMPLETED | OUTPATIENT
Start: 2024-03-29 | End: 2024-03-29

## 2024-03-29 RX ORDER — ACETAMINOPHEN 325 MG/1
650 TABLET ORAL ONCE
Status: COMPLETED | OUTPATIENT
Start: 2024-03-29 | End: 2024-03-29

## 2024-03-29 RX ADMIN — SODIUM CHLORIDE, PRESERVATIVE FREE 10 ML: 5 INJECTION INTRAVENOUS at 09:32

## 2024-03-29 RX ADMIN — DIPHENHYDRAMINE HYDROCHLORIDE 25 MG: 25 CAPSULE ORAL at 09:31

## 2024-03-29 RX ADMIN — SODIUM CHLORIDE, PRESERVATIVE FREE 10 ML: 5 INJECTION INTRAVENOUS at 15:05

## 2024-03-29 RX ADMIN — ACETAMINOPHEN 650 MG: 325 TABLET ORAL at 09:31

## 2024-03-30 LAB
ABO + RH BLD: NORMAL
BLD PROD TYP BPU: NORMAL
BLD PROD TYP BPU: NORMAL
BLOOD BANK BLOOD PRODUCT EXPIRATION DATE: NORMAL
BLOOD BANK BLOOD PRODUCT EXPIRATION DATE: NORMAL
BLOOD BANK DISPENSE STATUS: NORMAL
BLOOD BANK DISPENSE STATUS: NORMAL
BLOOD BANK ISBT PRODUCT BLOOD TYPE: 7300
BLOOD BANK ISBT PRODUCT BLOOD TYPE: 7300
BLOOD BANK PRODUCT CODE: NORMAL
BLOOD BANK PRODUCT CODE: NORMAL
BLOOD BANK UNIT TYPE AND RH: NORMAL
BLOOD BANK UNIT TYPE AND RH: NORMAL
BLOOD GROUP ANTIBODIES SERPL: NORMAL
BPU ID: NORMAL
BPU ID: NORMAL
CROSSMATCH RESULT: NORMAL
CROSSMATCH RESULT: NORMAL
SPECIMEN EXP DATE BLD: NORMAL
UNIT DIVISION: 0
UNIT DIVISION: 0
UNIT ISSUE DATE/TIME: NORMAL
UNIT ISSUE DATE/TIME: NORMAL

## 2024-04-05 ENCOUNTER — TELEPHONE (OUTPATIENT)
Age: 89
End: 2024-04-05

## 2024-04-05 NOTE — TELEPHONE ENCOUNTER
Dr. Christine Gastelum MD would like Dr. Melendez to call her back in reference to this mutual pt.  Personal cell # [phone] or Office # [phone]

## 2024-04-08 NOTE — PROGRESS NOTES
Addended by: RACHNA JOHNSON on: 4/8/2024 05:14 PM     Modules accepted: Orders     [facility] Lab Draw Note:    Labs drawn peripherally from right arm - CBC w/ diff, Extra tube to blood bank    Tolerated well.  Pt denies any acute problems/changes. Discharged from [facility] ambulatory. No distress. Next appt: 2/29/24 (tentatively)       See EPIC for pending lab results.

## 2024-04-10 ENCOUNTER — HOSPITAL ENCOUNTER (OUTPATIENT)
Facility: HOSPITAL | Age: 89
Setting detail: INFUSION SERIES
Discharge: HOME | End: 2024-04-10
Payer: MEDICARE

## 2024-04-10 DIAGNOSIS — D46.4 REFRACTORY ANEMIA DUE TO MYELODYSPLASTIC SYNDROME (HCC): ICD-10-CM

## 2024-04-10 DIAGNOSIS — D46.9 MDS (MYELODYSPLASTIC SYNDROME) (HCC): Primary | ICD-10-CM

## 2024-04-10 LAB
BASOPHILS # BLD: 0 K/UL (ref 0–0.1)
BASOPHILS NFR BLD: 1 % (ref 0–1)
DIFFERENTIAL METHOD BLD: ABNORMAL
EOSINOPHIL # BLD: 0 K/UL (ref 0–0.4)
EOSINOPHIL NFR BLD: 1 % (ref 0–7)
ERYTHROCYTE [DISTWIDTH] IN BLOOD BY AUTOMATED COUNT: 15.6 % (ref 11.5–14.5)
HCT VFR BLD AUTO: 23.9 % (ref 35–47)
HGB BLD-MCNC: 7.5 G/DL (ref 11.5–16)
IMM GRANULOCYTES # BLD AUTO: 0 K/UL (ref 0–0.04)
IMM GRANULOCYTES NFR BLD AUTO: 0 % (ref 0–0.5)
LYMPHOCYTES # BLD: 1.7 K/UL (ref 0.8–3.5)
LYMPHOCYTES NFR BLD: 61 % (ref 12–49)
MCH RBC QN AUTO: 29.1 PG (ref 26–34)
MCHC RBC AUTO-ENTMCNC: 31.4 G/DL (ref 30–36.5)
MCV RBC AUTO: 92.6 FL (ref 80–99)
MONOCYTES # BLD: 0 K/UL (ref 0–1)
MONOCYTES NFR BLD: 1 % (ref 5–13)
NEUTS SEG # BLD: 1 K/UL (ref 1.8–8)
NEUTS SEG NFR BLD: 36 % (ref 32–75)
NRBC # BLD: 0 K/UL (ref 0–0.01)
NRBC BLD-RTO: 0 PER 100 WBC
PLATELET # BLD AUTO: 69 K/UL (ref 150–400)
PMV BLD AUTO: 11.9 FL (ref 8.9–12.9)
RBC # BLD AUTO: 2.58 M/UL (ref 3.8–5.2)
RBC MORPH BLD: ABNORMAL
WBC # BLD AUTO: 2.7 K/UL (ref 3.6–11)

## 2024-04-10 PROCEDURE — 85025 COMPLETE CBC W/AUTO DIFF WBC: CPT

## 2024-04-10 PROCEDURE — 36415 COLL VENOUS BLD VENIPUNCTURE: CPT

## 2024-04-10 NOTE — PROGRESS NOTES
[facility] Lab Draw Note:  Arrived - 0815    Labs drawn peripherally from L arm - CBC, SBB    Recent Results (from the past 12 hour(s))   CBC with Auto Differential    Collection Time: 04/10/24  8:15 AM   Result Value Ref Range    WBC 2.7 (L) 3.6 - 11.0 K/uL    RBC 2.58 (L) 3.80 - 5.20 M/uL    Hemoglobin 7.5 (L) 11.5 - 16.0 g/dL    Hematocrit 23.9 (L) 35.0 - 47.0 %    MCV 92.6 80.0 - 99.0 FL    MCH 29.1 26.0 - 34.0 PG    MCHC 31.4 30.0 - 36.5 g/dL    RDW 15.6 (H) 11.5 - 14.5 %    Platelets 69 (L) 150 - 400 K/uL    MPV 11.9 8.9 - 12.9 FL    Nucleated RBCs 0.0 0  WBC    nRBC 0.00 0.00 - 0.01 K/uL    Neutrophils % 36 32 - 75 %    Lymphocytes % 61 (H) 12 - 49 %    Monocytes % 1 (L) 5 - 13 %    Eosinophils % 1 0 - 7 %    Basophils % 1 0 - 1 %    Immature Granulocytes % 0 0.0 - 0.5 %    Neutrophils Absolute 1.0 (L) 1.8 - 8.0 K/UL    Lymphocytes Absolute 1.7 0.8 - 3.5 K/UL    Monocytes Absolute 0.0 0.0 - 1.0 K/UL    Eosinophils Absolute 0.0 0.0 - 0.4 K/UL    Basophils Absolute 0.0 0.0 - 0.1 K/UL    Immature Granulocytes Absolute 0.0 0.00 - 0.04 K/UL    Differential Type AUTOMATED      RBC Comment ANISOCYTOSIS  1+           0820 - Tolerated well.  Pt denies any acute problems/changes. Discharged from [facility] ambulatory. No distress. Next appt: 4/17/24 at 0800       See EPIC for pending lab results.

## 2024-04-17 ENCOUNTER — HOSPITAL ENCOUNTER (OUTPATIENT)
Facility: HOSPITAL | Age: 89
Setting detail: INFUSION SERIES
Discharge: HOME | End: 2024-04-17
Payer: MEDICARE

## 2024-04-17 ENCOUNTER — OFFICE VISIT (OUTPATIENT)
Age: 89
End: 2024-04-17
Payer: MEDICARE

## 2024-04-17 VITALS
HEIGHT: 62 IN | HEART RATE: 64 BPM | OXYGEN SATURATION: 99 % | BODY MASS INDEX: 21.35 KG/M2 | DIASTOLIC BLOOD PRESSURE: 58 MMHG | WEIGHT: 116 LBS | SYSTOLIC BLOOD PRESSURE: 142 MMHG | TEMPERATURE: 97.7 F

## 2024-04-17 VITALS — WEIGHT: 115.6 LBS | BODY MASS INDEX: 21.27 KG/M2 | HEIGHT: 62 IN

## 2024-04-17 DIAGNOSIS — D46.4 REFRACTORY ANEMIA DUE TO MYELODYSPLASTIC SYNDROME (HCC): ICD-10-CM

## 2024-04-17 DIAGNOSIS — Z51.11 ENCOUNTER FOR CHEMOTHERAPY MANAGEMENT: ICD-10-CM

## 2024-04-17 DIAGNOSIS — D61.89 ANEMIA DUE TO OTHER BONE MARROW FAILURE (HCC): ICD-10-CM

## 2024-04-17 DIAGNOSIS — D47.1 PRIMARY MYELOFIBROSIS (HCC): Primary | ICD-10-CM

## 2024-04-17 DIAGNOSIS — D46.9 MDS (MYELODYSPLASTIC SYNDROME) (HCC): Primary | ICD-10-CM

## 2024-04-17 LAB
BASOPHILS # BLD: 0 K/UL (ref 0–0.1)
BASOPHILS NFR BLD: 2 % (ref 0–1)
DIFFERENTIAL METHOD BLD: ABNORMAL
EOSINOPHIL # BLD: 0 K/UL (ref 0–0.4)
EOSINOPHIL NFR BLD: 0 % (ref 0–7)
ERYTHROCYTE [DISTWIDTH] IN BLOOD BY AUTOMATED COUNT: 16.3 % (ref 11.5–14.5)
HCT VFR BLD AUTO: 22.9 % (ref 35–47)
HGB BLD-MCNC: 7.2 G/DL (ref 11.5–16)
IMM GRANULOCYTES # BLD AUTO: 0 K/UL (ref 0–0.04)
IMM GRANULOCYTES NFR BLD AUTO: 0 % (ref 0–0.5)
LYMPHOCYTES # BLD: 1.3 K/UL (ref 0.8–3.5)
LYMPHOCYTES NFR BLD: 52 % (ref 12–49)
MCH RBC QN AUTO: 29.1 PG (ref 26–34)
MCHC RBC AUTO-ENTMCNC: 31.4 G/DL (ref 30–36.5)
MCV RBC AUTO: 92.7 FL (ref 80–99)
MONOCYTES # BLD: 0.1 K/UL (ref 0–1)
MONOCYTES NFR BLD: 6 % (ref 5–13)
NEUTS SEG # BLD: 0.9 K/UL (ref 1.8–8)
NEUTS SEG NFR BLD: 40 % (ref 32–75)
NRBC # BLD: 0.03 K/UL (ref 0–0.01)
NRBC BLD-RTO: 1.3 PER 100 WBC
PLATELET # BLD AUTO: 116 K/UL (ref 150–400)
PMV BLD AUTO: 12.7 FL (ref 8.9–12.9)
RBC # BLD AUTO: 2.47 M/UL (ref 3.8–5.2)
RBC MORPH BLD: ABNORMAL
RBC MORPH BLD: ABNORMAL
WBC # BLD AUTO: 2.3 K/UL (ref 3.6–11)

## 2024-04-17 PROCEDURE — 36415 COLL VENOUS BLD VENIPUNCTURE: CPT

## 2024-04-17 PROCEDURE — 85025 COMPLETE CBC W/AUTO DIFF WBC: CPT

## 2024-04-17 PROCEDURE — 99214 OFFICE O/P EST MOD 30 MIN: CPT | Performed by: INTERNAL MEDICINE

## 2024-04-17 NOTE — PROGRESS NOTES
Esther Kaiser is a 88 y.o. female here for follow up for myelofibrosis.   Started Momelatimib on Feb 14th   -2 lbs since last visit.   No issues with diarrhea.   She has red knots on her legs that are sore.   She sleeps a lot but the crazy dreams have stopped.     1. Have you been to the ER, urgent care clinic since your last visit?  Hospitalized since your last visit?  3/17/24 fall/rib injury    2. Have you seen or consulted any other health care providers outside of the [facility] since your last visit?  Include any pap smears or colon screening.  no  
      1. Transfuse RBC PRN  2. Weekly labs  3. Momelatinib 200 mg daily      Signed by: Zander Elena MD                     April 17, 2024

## 2024-04-17 NOTE — PROGRESS NOTES
[facility] Lab Draw Note:  Arrived - 0801    Labs drawn peripherally from Right arm and sent for processing        Tolerated well. Discharged from [facility] ambulatory. No distress. Next appt: 4/24/24 at 0800       See EPIC for pending lab results.

## 2024-04-24 ENCOUNTER — CLINICAL DOCUMENTATION (OUTPATIENT)
Age: 89
End: 2024-04-24

## 2024-04-24 ENCOUNTER — HOSPITAL ENCOUNTER (OUTPATIENT)
Facility: HOSPITAL | Age: 89
Setting detail: INFUSION SERIES
Discharge: HOME | End: 2024-04-24
Payer: MEDICARE

## 2024-04-24 DIAGNOSIS — D46.9 MDS (MYELODYSPLASTIC SYNDROME) (HCC): ICD-10-CM

## 2024-04-24 DIAGNOSIS — D46.4 REFRACTORY ANEMIA DUE TO MYELODYSPLASTIC SYNDROME (HCC): Primary | ICD-10-CM

## 2024-04-24 LAB
BASOPHILS # BLD: 0 K/UL (ref 0–0.1)
BASOPHILS NFR BLD: 0 % (ref 0–1)
DIFFERENTIAL METHOD BLD: ABNORMAL
EOSINOPHIL # BLD: 0 K/UL (ref 0–0.4)
EOSINOPHIL NFR BLD: 0 % (ref 0–7)
ERYTHROCYTE [DISTWIDTH] IN BLOOD BY AUTOMATED COUNT: 17.3 % (ref 11.5–14.5)
HCT VFR BLD AUTO: 20.4 % (ref 35–47)
HGB BLD-MCNC: 6.4 G/DL (ref 11.5–16)
HISTORY CHECK: NORMAL
IMM GRANULOCYTES # BLD AUTO: 0 K/UL (ref 0–0.04)
IMM GRANULOCYTES NFR BLD AUTO: 1 % (ref 0–0.5)
LYMPHOCYTES # BLD: 1.4 K/UL (ref 0.8–3.5)
LYMPHOCYTES NFR BLD: 54 % (ref 12–49)
MCH RBC QN AUTO: 27.7 PG (ref 26–34)
MCHC RBC AUTO-ENTMCNC: 31.4 G/DL (ref 30–36.5)
MCV RBC AUTO: 88.3 FL (ref 80–99)
MONOCYTES # BLD: 0.1 K/UL (ref 0–1)
MONOCYTES NFR BLD: 2 % (ref 5–13)
NEUTS SEG # BLD: 1.2 K/UL (ref 1.8–8)
NEUTS SEG NFR BLD: 43 % (ref 32–75)
NRBC # BLD: 0.03 K/UL (ref 0–0.01)
NRBC BLD-RTO: 1.1 PER 100 WBC
PLATELET # BLD AUTO: 142 K/UL (ref 150–400)
PMV BLD AUTO: 12.3 FL (ref 8.9–12.9)
RBC # BLD AUTO: 2.31 M/UL (ref 3.8–5.2)
RBC MORPH BLD: ABNORMAL
RBC MORPH BLD: ABNORMAL
WBC # BLD AUTO: 2.7 K/UL (ref 3.6–11)

## 2024-04-24 PROCEDURE — 36415 COLL VENOUS BLD VENIPUNCTURE: CPT

## 2024-04-24 PROCEDURE — 85025 COMPLETE CBC W/AUTO DIFF WBC: CPT

## 2024-04-24 PROCEDURE — 86901 BLOOD TYPING SEROLOGIC RH(D): CPT

## 2024-04-24 PROCEDURE — 86923 COMPATIBILITY TEST ELECTRIC: CPT

## 2024-04-24 PROCEDURE — 86900 BLOOD TYPING SEROLOGIC ABO: CPT

## 2024-04-24 PROCEDURE — 86850 RBC ANTIBODY SCREEN: CPT

## 2024-04-24 RX ORDER — DIPHENHYDRAMINE HCL 25 MG
25 CAPSULE ORAL ONCE
Status: CANCELLED | OUTPATIENT
Start: 2024-04-24 | End: 2024-04-24

## 2024-04-24 RX ORDER — SODIUM CHLORIDE 9 MG/ML
INJECTION, SOLUTION INTRAVENOUS CONTINUOUS
Status: CANCELLED | OUTPATIENT
Start: 2024-04-24

## 2024-04-24 RX ORDER — EPINEPHRINE 1 MG/ML
0.3 INJECTION, SOLUTION INTRAMUSCULAR; SUBCUTANEOUS PRN
Status: CANCELLED | OUTPATIENT
Start: 2024-04-24

## 2024-04-24 RX ORDER — SODIUM CHLORIDE 9 MG/ML
25 INJECTION, SOLUTION INTRAVENOUS PRN
Status: CANCELLED | OUTPATIENT
Start: 2024-04-24

## 2024-04-24 RX ORDER — ONDANSETRON 2 MG/ML
8 INJECTION INTRAMUSCULAR; INTRAVENOUS
Status: CANCELLED | OUTPATIENT
Start: 2024-04-24

## 2024-04-24 RX ORDER — ACETAMINOPHEN 325 MG/1
650 TABLET ORAL
Status: CANCELLED | OUTPATIENT
Start: 2024-04-24

## 2024-04-24 RX ORDER — SODIUM CHLORIDE 0.9 % (FLUSH) 0.9 %
5-40 SYRINGE (ML) INJECTION PRN
Status: CANCELLED | OUTPATIENT
Start: 2024-04-24

## 2024-04-24 RX ORDER — DIPHENHYDRAMINE HYDROCHLORIDE 50 MG/ML
50 INJECTION INTRAMUSCULAR; INTRAVENOUS
Status: CANCELLED | OUTPATIENT
Start: 2024-04-24

## 2024-04-24 RX ORDER — ALBUTEROL SULFATE 90 UG/1
4 AEROSOL, METERED RESPIRATORY (INHALATION) PRN
Status: CANCELLED | OUTPATIENT
Start: 2024-04-24

## 2024-04-24 RX ORDER — ACETAMINOPHEN 325 MG/1
650 TABLET ORAL ONCE
Status: CANCELLED | OUTPATIENT
Start: 2024-04-24 | End: 2024-04-24

## 2024-04-24 RX ORDER — SODIUM CHLORIDE 9 MG/ML
20 INJECTION, SOLUTION INTRAVENOUS CONTINUOUS
Status: CANCELLED | OUTPATIENT
Start: 2024-04-24

## 2024-04-24 NOTE — PROGRESS NOTES
[facility] Lab Draw Note:    Labs drawn peripherally from right arm - CBC w/ diff, Extra tube to blood bank    Tolerated well.  Pt denies any acute problems/changes. Discharged from \[facility]\"" ambulatory. No distress. Next appt: 5/1/24       See EPIC for pending lab results.

## 2024-04-26 ENCOUNTER — HOSPITAL ENCOUNTER (OUTPATIENT)
Facility: HOSPITAL | Age: 89
Setting detail: INFUSION SERIES
Discharge: HOME | End: 2024-04-26
Payer: MEDICARE

## 2024-04-26 VITALS
DIASTOLIC BLOOD PRESSURE: 49 MMHG | HEIGHT: 62 IN | SYSTOLIC BLOOD PRESSURE: 137 MMHG | BODY MASS INDEX: 21.47 KG/M2 | TEMPERATURE: 98 F | WEIGHT: 116.7 LBS | HEART RATE: 61 BPM | RESPIRATION RATE: 16 BRPM | OXYGEN SATURATION: 99 %

## 2024-04-26 DIAGNOSIS — D46.4 REFRACTORY ANEMIA DUE TO MYELODYSPLASTIC SYNDROME (HCC): Primary | ICD-10-CM

## 2024-04-26 DIAGNOSIS — L98.9 SKIN PROBLEM: Primary | ICD-10-CM

## 2024-04-26 PROCEDURE — P9016 RBC LEUKOCYTES REDUCED: HCPCS

## 2024-04-26 PROCEDURE — 36430 TRANSFUSION BLD/BLD COMPNT: CPT

## 2024-04-26 PROCEDURE — 6370000000 HC RX 637 (ALT 250 FOR IP): Performed by: NURSE PRACTITIONER

## 2024-04-26 PROCEDURE — 2580000003 HC RX 258: Performed by: NURSE PRACTITIONER

## 2024-04-26 RX ORDER — DIPHENHYDRAMINE HCL 25 MG
25 CAPSULE ORAL ONCE
Status: COMPLETED | OUTPATIENT
Start: 2024-04-26 | End: 2024-04-26

## 2024-04-26 RX ORDER — DIPHENHYDRAMINE HYDROCHLORIDE 50 MG/ML
50 INJECTION INTRAMUSCULAR; INTRAVENOUS
OUTPATIENT
Start: 2024-04-26

## 2024-04-26 RX ORDER — ONDANSETRON 2 MG/ML
8 INJECTION INTRAMUSCULAR; INTRAVENOUS
OUTPATIENT
Start: 2024-04-26

## 2024-04-26 RX ORDER — SODIUM CHLORIDE 9 MG/ML
25 INJECTION, SOLUTION INTRAVENOUS PRN
Status: DISCONTINUED | OUTPATIENT
Start: 2024-04-26 | End: 2024-04-27 | Stop reason: HOSPADM

## 2024-04-26 RX ORDER — SODIUM CHLORIDE 9 MG/ML
INJECTION, SOLUTION INTRAVENOUS CONTINUOUS
OUTPATIENT
Start: 2024-04-26

## 2024-04-26 RX ORDER — SODIUM CHLORIDE 9 MG/ML
20 INJECTION, SOLUTION INTRAVENOUS CONTINUOUS
Status: DISCONTINUED | OUTPATIENT
Start: 2024-04-26 | End: 2024-04-27 | Stop reason: HOSPADM

## 2024-04-26 RX ORDER — ALBUTEROL SULFATE 90 UG/1
4 AEROSOL, METERED RESPIRATORY (INHALATION) PRN
OUTPATIENT
Start: 2024-04-26

## 2024-04-26 RX ORDER — ACETAMINOPHEN 325 MG/1
650 TABLET ORAL
OUTPATIENT
Start: 2024-04-26

## 2024-04-26 RX ORDER — ACETAMINOPHEN 325 MG/1
650 TABLET ORAL ONCE
OUTPATIENT
Start: 2024-04-26 | End: 2024-04-26

## 2024-04-26 RX ORDER — ACETAMINOPHEN 325 MG/1
650 TABLET ORAL ONCE
Status: COMPLETED | OUTPATIENT
Start: 2024-04-26 | End: 2024-04-26

## 2024-04-26 RX ORDER — SODIUM CHLORIDE 0.9 % (FLUSH) 0.9 %
5-40 SYRINGE (ML) INJECTION PRN
OUTPATIENT
Start: 2024-04-26

## 2024-04-26 RX ORDER — SODIUM CHLORIDE 9 MG/ML
20 INJECTION, SOLUTION INTRAVENOUS CONTINUOUS
OUTPATIENT
Start: 2024-04-26

## 2024-04-26 RX ORDER — DIPHENHYDRAMINE HCL 25 MG
25 CAPSULE ORAL ONCE
OUTPATIENT
Start: 2024-04-26 | End: 2024-04-26

## 2024-04-26 RX ORDER — EPINEPHRINE 1 MG/ML
0.3 INJECTION, SOLUTION INTRAMUSCULAR; SUBCUTANEOUS PRN
OUTPATIENT
Start: 2024-04-26

## 2024-04-26 RX ORDER — SODIUM CHLORIDE 9 MG/ML
25 INJECTION, SOLUTION INTRAVENOUS PRN
OUTPATIENT
Start: 2024-04-26

## 2024-04-26 RX ORDER — SODIUM CHLORIDE 0.9 % (FLUSH) 0.9 %
5-40 SYRINGE (ML) INJECTION PRN
Status: DISCONTINUED | OUTPATIENT
Start: 2024-04-26 | End: 2024-04-27 | Stop reason: HOSPADM

## 2024-04-26 RX ADMIN — DIPHENHYDRAMINE HYDROCHLORIDE 25 MG: 25 CAPSULE ORAL at 12:32

## 2024-04-26 RX ADMIN — SODIUM CHLORIDE 20 ML/HR: 9 INJECTION, SOLUTION INTRAVENOUS at 12:24

## 2024-04-26 RX ADMIN — ACETAMINOPHEN 650 MG: 325 TABLET ORAL at 12:32

## 2024-04-26 NOTE — PROGRESS NOTES
1200 Pt arrived at [facility] ambulatory and in no distress for transfusion of 2 units PRBCs.  Assessment completed. Pt complained of fatigue, BLE weakness, and difficulty and pain when swallowing liquids.  22G PIV established in Right Arm without difficulty.  Signs/symptoms of adverse blood reaction discussed with pt, voiced understanding.    Patient Vitals for the past 24 hrs:   BP Temp Temp src Pulse Resp SpO2 Height Weight   04/26/24 1506 (!) 137/49 98 °F (36.7 °C) Temporal 61 16 99 % -- --   04/26/24 1310 (!) 133/48 98.6 °F (37 °C) Temporal 83 16 100 % -- --   04/26/24 1249 (!) 121/52 98.7 °F (37.1 °C) Temporal 92 16 95 % -- --   04/26/24 1200 (!) 135/57 97.9 °F (36.6 °C) Temporal 74 -- 100 % 1.575 m (5' 2\") 52.9 kg (116 lb 11.2 oz)     Peripheral IV 04/26/24 Right;Dorsal Cephalic (Active)   Site Assessment Clean, dry & intact 04/26/24 1215   Line Status Brisk blood return 04/26/24 1215   Phlebitis Assessment No symptoms 04/26/24 1215   Infiltration Assessment 0 04/26/24 1215   Alcohol Cap Used Yes 04/26/24 1215   Dressing Status New dressing applied 04/26/24 1215   Dressing Type Transparent 04/26/24 1215   Dressing Intervention New 04/26/24 1215     Medications received:  NS @ KVO  Tylenol 650 mg po  Benadryl 25 mg po    1255:  1st unit PRBCs started and infusing without difficulty, observed x 15 minutes  1506:  1st unit completed without adverse reaction noted, NS flushing line.    1508 Tolerated transfusion  well, no adverse reaction noted.  D/C instructions reviewed, pt voiced understanding.  Patient declined 1 hour post transfusion observation.  D/Cd from [facility] 1510 and in no distress accompanied by daughter.  Next appt 5/1/24 @ [facility].    Nicole Albright RN  04/26/24

## 2024-05-01 ENCOUNTER — HOSPITAL ENCOUNTER (OUTPATIENT)
Facility: HOSPITAL | Age: 89
Setting detail: INFUSION SERIES
End: 2024-05-01

## 2024-05-08 ENCOUNTER — HOSPITAL ENCOUNTER (OUTPATIENT)
Facility: HOSPITAL | Age: 89
Setting detail: INFUSION SERIES
Discharge: HOME | End: 2024-05-08
Payer: MEDICARE

## 2024-05-08 DIAGNOSIS — D46.9 MDS (MYELODYSPLASTIC SYNDROME) (HCC): ICD-10-CM

## 2024-05-08 DIAGNOSIS — D46.4 REFRACTORY ANEMIA DUE TO MYELODYSPLASTIC SYNDROME (HCC): Primary | ICD-10-CM

## 2024-05-08 LAB
BASOPHILS # BLD: 0 K/UL (ref 0–0.1)
BASOPHILS NFR BLD: 1 % (ref 0–1)
DIFFERENTIAL METHOD BLD: ABNORMAL
EOSINOPHIL # BLD: 0 K/UL (ref 0–0.4)
EOSINOPHIL NFR BLD: 0 % (ref 0–7)
ERYTHROCYTE [DISTWIDTH] IN BLOOD BY AUTOMATED COUNT: 19.1 % (ref 11.5–14.5)
HCT VFR BLD AUTO: 22.3 % (ref 35–47)
HGB BLD-MCNC: 7.1 G/DL (ref 11.5–16)
HISTORY CHECK: NORMAL
IMM GRANULOCYTES # BLD AUTO: 0.1 K/UL (ref 0–0.04)
IMM GRANULOCYTES NFR BLD AUTO: 2 % (ref 0–0.5)
LYMPHOCYTES # BLD: 0.7 K/UL (ref 0.8–3.5)
LYMPHOCYTES NFR BLD: 26 % (ref 12–49)
MCH RBC QN AUTO: 27.5 PG (ref 26–34)
MCHC RBC AUTO-ENTMCNC: 31.8 G/DL (ref 30–36.5)
MCV RBC AUTO: 86.4 FL (ref 80–99)
MONOCYTES # BLD: 0.1 K/UL (ref 0–1)
MONOCYTES NFR BLD: 2 % (ref 5–13)
NEUTS SEG # BLD: 1.7 K/UL (ref 1.8–8)
NEUTS SEG NFR BLD: 69 % (ref 32–75)
NRBC # BLD: 0.03 K/UL (ref 0–0.01)
NRBC BLD-RTO: 1.2 PER 100 WBC
PLATELET # BLD AUTO: 96 K/UL (ref 150–400)
RBC # BLD AUTO: 2.58 M/UL (ref 3.8–5.2)
RBC MORPH BLD: ABNORMAL
WBC # BLD AUTO: 2.6 K/UL (ref 3.6–11)

## 2024-05-08 PROCEDURE — 86923 COMPATIBILITY TEST ELECTRIC: CPT

## 2024-05-08 PROCEDURE — 86850 RBC ANTIBODY SCREEN: CPT

## 2024-05-08 PROCEDURE — 85025 COMPLETE CBC W/AUTO DIFF WBC: CPT

## 2024-05-08 PROCEDURE — 86901 BLOOD TYPING SEROLOGIC RH(D): CPT

## 2024-05-08 PROCEDURE — 86900 BLOOD TYPING SEROLOGIC ABO: CPT

## 2024-05-08 PROCEDURE — 36415 COLL VENOUS BLD VENIPUNCTURE: CPT

## 2024-05-08 RX ORDER — ALBUTEROL SULFATE 90 UG/1
4 AEROSOL, METERED RESPIRATORY (INHALATION) PRN
OUTPATIENT
Start: 2024-05-08

## 2024-05-08 RX ORDER — SODIUM CHLORIDE 9 MG/ML
25 INJECTION, SOLUTION INTRAVENOUS PRN
OUTPATIENT
Start: 2024-05-08

## 2024-05-08 RX ORDER — ACETAMINOPHEN 325 MG/1
650 TABLET ORAL
OUTPATIENT
Start: 2024-05-08

## 2024-05-08 RX ORDER — DIPHENHYDRAMINE HYDROCHLORIDE 50 MG/ML
50 INJECTION INTRAMUSCULAR; INTRAVENOUS
OUTPATIENT
Start: 2024-05-08

## 2024-05-08 RX ORDER — EPINEPHRINE 1 MG/ML
0.3 INJECTION, SOLUTION INTRAMUSCULAR; SUBCUTANEOUS PRN
OUTPATIENT
Start: 2024-05-08

## 2024-05-08 RX ORDER — SODIUM CHLORIDE 9 MG/ML
INJECTION, SOLUTION INTRAVENOUS CONTINUOUS
OUTPATIENT
Start: 2024-05-08

## 2024-05-08 RX ORDER — ACETAMINOPHEN 325 MG/1
650 TABLET ORAL ONCE
Status: CANCELLED | OUTPATIENT
Start: 2024-05-08 | End: 2024-05-08

## 2024-05-08 RX ORDER — ONDANSETRON 2 MG/ML
8 INJECTION INTRAMUSCULAR; INTRAVENOUS
OUTPATIENT
Start: 2024-05-08

## 2024-05-08 RX ORDER — SODIUM CHLORIDE 9 MG/ML
20 INJECTION, SOLUTION INTRAVENOUS CONTINUOUS
Status: CANCELLED | OUTPATIENT
Start: 2024-05-08

## 2024-05-08 RX ORDER — DIPHENHYDRAMINE HCL 25 MG
25 CAPSULE ORAL ONCE
Status: CANCELLED | OUTPATIENT
Start: 2024-05-08 | End: 2024-05-08

## 2024-05-08 RX ORDER — SODIUM CHLORIDE 0.9 % (FLUSH) 0.9 %
5-40 SYRINGE (ML) INJECTION PRN
Status: CANCELLED | OUTPATIENT
Start: 2024-05-08

## 2024-05-08 NOTE — PROGRESS NOTES
[facility] Lab Draw Note    Pt arrived in stable condition for Labs    Recent Results (from the past 24 hour(s))   CBC with Auto Differential    Collection Time: 05/08/24  8:14 AM   Result Value Ref Range    WBC 2.6 (L) 3.6 - 11.0 K/uL    RBC 2.58 (L) 3.80 - 5.20 M/uL    Hemoglobin 7.1 (L) 11.5 - 16.0 g/dL    Hematocrit 22.3 (L) 35.0 - 47.0 %    MCV 86.4 80.0 - 99.0 FL    MCH 27.5 26.0 - 34.0 PG    MCHC 31.8 30.0 - 36.5 g/dL    RDW 19.1 (H) 11.5 - 14.5 %    Platelets 96 (L) 150 - 400 K/uL    Nucleated RBCs 1.2 (H) 0  WBC    nRBC 0.03 (H) 0.00 - 0.01 K/uL    Neutrophils % 69 32 - 75 %    Lymphocytes % 26 12 - 49 %    Monocytes % 2 (L) 5 - 13 %    Eosinophils % 0 0 - 7 %    Basophils % 1 0 - 1 %    Immature Granulocytes % 2 (H) 0.0 - 0.5 %    Neutrophils Absolute 1.7 (L) 1.8 - 8.0 K/UL    Lymphocytes Absolute 0.7 (L) 0.8 - 3.5 K/UL    Monocytes Absolute 0.1 0.0 - 1.0 K/UL    Eosinophils Absolute 0.0 0.0 - 0.4 K/UL    Basophils Absolute 0.0 0.0 - 0.1 K/UL    Immature Granulocytes Absolute 0.1 (H) 0.00 - 0.04 K/UL    Differential Type SMEAR SCANNED      RBC Comment ANISOCYTOSIS  1+          Labs drawn per order and sent for processing.     Pt discharged from [facility] in no distress.    Next appt:  Future Appointments   Date Time Provider Department Center   5/15/2024  8:00 AM [facility] MED INF CHAIR 6 [facility]   5/15/2024  8:30 AM Zander Elena MD [facility]   5/22/2024  8:00 AM [facility] MED INF CHAIR 5 [facility]   5/29/2024  8:00 AM [facility] CHEMO CHAIR 5 [facility]   6/5/2024  8:00 AM [facility] LAB CHAIR 1 [facility]   6/12/2024  8:00 AM [facility] MED INF CHAIR 6 [facility]   6/12/2024  8:30 AM Zander Elena MD [facility] AMB   6/19/2024  9:00 AM [facility]STTR[facility] 2 [facility]   6/26/2024  8:00 AM [facility] MED INF CHAIR 5 [facility]   7/3/2024  8:30 AM [facility] LAB CHAIR 1 [facility]   7/10/2024  8:30 AM [facility] LAB CHAIR 1 [facility]   7/10/2024  8:45 AM Zander Elena MD [facility] AMB   7/17/2024  9:00 AM [facility] LAB CHAIR 1

## 2024-05-10 ENCOUNTER — HOSPITAL ENCOUNTER (OUTPATIENT)
Facility: HOSPITAL | Age: 89
Setting detail: INFUSION SERIES
Discharge: HOME | End: 2024-05-10
Payer: MEDICARE

## 2024-05-10 ENCOUNTER — CLINICAL DOCUMENTATION (OUTPATIENT)
Age: 89
End: 2024-05-10

## 2024-05-10 VITALS
RESPIRATION RATE: 16 BRPM | BODY MASS INDEX: 21.49 KG/M2 | DIASTOLIC BLOOD PRESSURE: 63 MMHG | WEIGHT: 116.8 LBS | OXYGEN SATURATION: 100 % | HEIGHT: 62 IN | SYSTOLIC BLOOD PRESSURE: 136 MMHG | HEART RATE: 58 BPM | TEMPERATURE: 98 F

## 2024-05-10 DIAGNOSIS — D46.4 REFRACTORY ANEMIA DUE TO MYELODYSPLASTIC SYNDROME (HCC): Primary | ICD-10-CM

## 2024-05-10 PROCEDURE — P9016 RBC LEUKOCYTES REDUCED: HCPCS

## 2024-05-10 PROCEDURE — 6370000000 HC RX 637 (ALT 250 FOR IP): Performed by: NURSE PRACTITIONER

## 2024-05-10 PROCEDURE — 2580000003 HC RX 258: Performed by: NURSE PRACTITIONER

## 2024-05-10 PROCEDURE — 36430 TRANSFUSION BLD/BLD COMPNT: CPT

## 2024-05-10 RX ORDER — DIPHENHYDRAMINE HYDROCHLORIDE 50 MG/ML
50 INJECTION INTRAMUSCULAR; INTRAVENOUS
OUTPATIENT
Start: 2024-05-10

## 2024-05-10 RX ORDER — ALBUTEROL SULFATE 90 UG/1
4 AEROSOL, METERED RESPIRATORY (INHALATION) PRN
OUTPATIENT
Start: 2024-05-10

## 2024-05-10 RX ORDER — EPINEPHRINE 1 MG/ML
0.3 INJECTION, SOLUTION INTRAMUSCULAR; SUBCUTANEOUS PRN
OUTPATIENT
Start: 2024-05-10

## 2024-05-10 RX ORDER — SODIUM CHLORIDE 0.9 % (FLUSH) 0.9 %
5-40 SYRINGE (ML) INJECTION PRN
OUTPATIENT
Start: 2024-05-10

## 2024-05-10 RX ORDER — ACETAMINOPHEN 325 MG/1
650 TABLET ORAL
OUTPATIENT
Start: 2024-05-10

## 2024-05-10 RX ORDER — SODIUM CHLORIDE 9 MG/ML
20 INJECTION, SOLUTION INTRAVENOUS CONTINUOUS
OUTPATIENT
Start: 2024-05-10

## 2024-05-10 RX ORDER — DIPHENHYDRAMINE HCL 25 MG
25 CAPSULE ORAL ONCE
OUTPATIENT
Start: 2024-05-10 | End: 2024-05-10

## 2024-05-10 RX ORDER — SODIUM CHLORIDE 9 MG/ML
20 INJECTION, SOLUTION INTRAVENOUS CONTINUOUS
Status: DISCONTINUED | OUTPATIENT
Start: 2024-05-10 | End: 2024-05-11 | Stop reason: HOSPADM

## 2024-05-10 RX ORDER — DIPHENHYDRAMINE HCL 25 MG
25 CAPSULE ORAL ONCE
Status: COMPLETED | OUTPATIENT
Start: 2024-05-10 | End: 2024-05-10

## 2024-05-10 RX ORDER — SODIUM CHLORIDE 9 MG/ML
INJECTION, SOLUTION INTRAVENOUS CONTINUOUS
OUTPATIENT
Start: 2024-05-10

## 2024-05-10 RX ORDER — SODIUM CHLORIDE 0.9 % (FLUSH) 0.9 %
5-40 SYRINGE (ML) INJECTION PRN
Status: DISCONTINUED | OUTPATIENT
Start: 2024-05-10 | End: 2024-05-11 | Stop reason: HOSPADM

## 2024-05-10 RX ORDER — ONDANSETRON 2 MG/ML
8 INJECTION INTRAMUSCULAR; INTRAVENOUS
OUTPATIENT
Start: 2024-05-10

## 2024-05-10 RX ORDER — SODIUM CHLORIDE 9 MG/ML
25 INJECTION, SOLUTION INTRAVENOUS PRN
OUTPATIENT
Start: 2024-05-10

## 2024-05-10 RX ORDER — ACETAMINOPHEN 325 MG/1
650 TABLET ORAL ONCE
Status: COMPLETED | OUTPATIENT
Start: 2024-05-10 | End: 2024-05-10

## 2024-05-10 RX ORDER — ACETAMINOPHEN 325 MG/1
650 TABLET ORAL ONCE
OUTPATIENT
Start: 2024-05-10 | End: 2024-05-10

## 2024-05-10 RX ADMIN — ACETAMINOPHEN 650 MG: 325 TABLET ORAL at 08:25

## 2024-05-10 RX ADMIN — SODIUM CHLORIDE, PRESERVATIVE FREE 10 ML: 5 INJECTION INTRAVENOUS at 09:20

## 2024-05-10 RX ADMIN — SODIUM CHLORIDE, PRESERVATIVE FREE 10 ML: 5 INJECTION INTRAVENOUS at 11:06

## 2024-05-10 RX ADMIN — SODIUM CHLORIDE 20 ML/HR: 9 INJECTION, SOLUTION INTRAVENOUS at 08:45

## 2024-05-10 RX ADMIN — DIPHENHYDRAMINE HYDROCHLORIDE 25 MG: 25 CAPSULE ORAL at 08:25

## 2024-05-10 NOTE — CONSENT
Informed Consent for Blood Component Transfusion Note    I have discussed with the patient and daughter the rationale for blood component transfusion; its benefits in treating or preventing fatigue, organ damage, or death; and its risk which includes mild transfusion reactions, rare risk of blood borne infection, or more serious but rare reactions. I have discussed the alternatives to transfusion, including the risk and consequences of not receiving transfusion. The patient and daughter had an opportunity to ask questions and had agreed to proceed with transfusion of blood components.    Electronically signed by MICHELINE Knight CNP on 5/10/24 at 8:21 AM EDT   [facility] [address] [place]  [address]  [phone]    REPORT OF PSYCHOLOGICAL TESTING    NAME: Indira Waters  MRN: [id number]  : 1946    REFERRED BY:  Colin Gibbs NP / Holden Pereira M.D.    REASON FOR REFERRAL:  Psychological Evaluation prior to surgical implantation of a spinal cord stimulator (SCS) to address chronic pain    EVALUATED BY:  Sandra Nieves, Ph.D., Clinical Psychologist  SOUMYA PonceSPorfirio, Psychometrician    DATES OF EVALUATION:  2022, 2022    EVALUATION PROCEDURES AND TIMES:  Conducted by Psychologist (2 hours):  Integration of patient data, interpretation of standardized test results and clinical data, clinical decision-making, treatment planning and report, and interactive feedback to the patient  CPT Codes:  26866 - 1 hour; 41020 - 1 hour  Conducted by Technician (1 hour, 15 minutes):  Psychological test administration and scoring by technician, two or more tests, any method:  [place] Multiphasic Personality Inventory - 3 (MMPI-3); Pain and Impairment Relationship Scale (PAIRS); Bah Pain Catastrophizing Scale (PCS)  CPT Codes:  34689 - 30 minutes; 57957 - 30 minutes, 74356 - 30 minutes    EVALUATION FINDINGS:  The diagnostic interview revealed that Ms. Indira Waters is a 75-year-old female referred for Psychological Evaluation prior to surgical implantation of a spinal cord stimulator (SCS) to address chronic pain throughout her body due to neuropathy.  Her pain has been present since she was in her 60s without sufficient relief despite multiple pain management treatments.  Her activities are greatly limited.  Ms. Waters is now interested in a trial of SCS.  She understood risks of surgery and indicated no significant concerns.  She has reasonable expectations for SCS and will consider other treatment options in the future if it is ineffective.    Regarding mental health history, Ms. Waters endorsed a history of  pharmacotherapy for depression and insomnia.  Currently she reports no significant psychiatric distress, although she does continue to have insomnia secondary to chronic pain.  She notes no major psychosocial stressors but has multiple protective factors (good social support, stability, whit, engagement in recreation) to help her manage.  She has a positive attitude and is motivated to improve quality of life.  She denied problems with substance abuse, suicidal or homicidal ideation, psychotic symptoms, and cognitive functioning.    The medical record also revealed the following diagnoses relevant to this evaluation:  Diabetic polyneuropathy associated with diabetes mellitus due to underlying condition; Type 2 diabetes mellitus with hyperglycemia, with long-term current use of insulin; Encounter for therapeutic drug monitoring; Chronic pain syndrome; Diabetic polyneuropathy associated with type 2 diabetes mellitus.    TEST DATA:  All tests were administered according to standardized procedures and were selected based on the reason for referral.  Effort on all tests was satisfactory to produce interpretable results.    MMPI-3.  The MMPI-3 provides an assessment of personality and psychopathology with specific evaluation of psychosocial risk factors associated with outcomes of spinal cord stimulation.  Ms. Waters produced an interpretable MMPI-3 profile despite evidence of potential under-reporting. This profile should be interpreted with these issues in mind, in which these results do not rule out symptoms or the possibility that certain treatment approaches could prove helpful for optimizing the candidates outcomes.   TEST RESULTS.  Ms. Waters reports somatic complaints.  There are no indications of interpersonal problems in her profile.  Although there are no indications of emotional problems, disordered thinking, or behavioral issues, these problems cannot be ruled-out due to indications of under-reporting  described earlier.   Somatic.  She reports multiple complaints including gastrointestinal symptoms and neurological symptoms.  She is likely preoccupied with physical health concerns and reports they interfere with her life.  She may be prone to developing worsened physical symptoms when stressed.  GROUP COMPARISONS.  Compared to spine surgery candidates, Ms. Waters reports greater somatic complaints, neurological symptoms, and interpersonal dominance.  Only results that are bolded are at a clinically significant level.  RISK ASSESSMENTS.  The following likelihoods are based on test results and research, and are correlational rather than causational.  Her profile was associated with no significant risks for adverse postsurgical outcomes.    PAIRS and PCS.  The PAIRS and PCS reveal beliefs and attitudes related to pain that may impact outcomes of spinal cord stimulation.  The PAIRS indicated non-significant complications related to perceptions of impairment with a total score of 74 (a score over 75 is clinically significant).  The PCS indicated significant catastrophizing of pain with a total score of 36, which is in the 87th percentile (a score over 30 is in the 75th percentile and is clinically significant).  Her subscale scores indicated minimal Rumination (65th percentile), significant Magnification (86th percentile), and significant Helplessness (92nd percentile).    FEEDBACK.  Ms. Waters was provided with test results, and offered the opportunity to respond to feedback and clarify results if needed.  She acknowledged the test results as being largely consistent with her overall functioning and well-being.  She was provided with feedback and recommendations to address pain catastrophizing.    DIAGNOSTIC IMPRESSIONS:    ICD-10-CM ICD-9-CM   1. Preoperative evaluation to rule out surgical contraindication  Z01.818 V72.83   2. Chronic pain syndrome  G89.4 338.4   3. Neuralgia and neuritis  M79.2 729.2   4. Painful  diabetic neuropathy  E11.40 250.60     357.2   5. History of depression  Z86.59 V11.8   6. Insomnia secondary to chronic pain  G89.29 338.29    G47.01 327.01       SUMMARY AND RECOMMENDATIONS:  Ms. Waters has a long history of severe pain and is pursuing the spinal cord stimulator (SCS) in an effort to improve pain and quality of life.  Test results should be considered interpretable, and indicate that she reports pain catastrophizing but no significant psychiatric distress or other problems that would contraindicate surgery.  Her profile was not associated with any significant risks for adverse postsurgical outcomes, and she was provided with feedback to address pain coping.  Based on research and recommendations regarding presurgical psychological screening for pain control procedures, her test results and reports are within the expected range to predict adequate outcomes and patient satisfaction.      Ms. Munoz testing profile was largely consistent with her reports in the clinical interview.  In the clinical interview, Ms. Waters acknowledged pharmacotherapy for depression but denied current psychiatric distress or major psychosocial stressors.  She is hopeful that insomnia will improve with reductions in pain.  Ms. Waters has multiple protective factors to help her manage stress and that should help her as she moves forward with SCS and recovery.  There are no recommendations for psychological intervention at this time.  This evaluation revealed NO contraindications to SCS from a psychological perspective.          Report and interpretation and coding were completed on 03/03/2022.            Sandra Nieves, PhD  Clinical Psychologist

## 2024-05-10 NOTE — PROGRESS NOTES
0805  Pt arrived at hospitals ambulatory and in no distress for transfusion of 1 unit PRBCs.   Assessment completed, no new complaints voiced. 22G IV established in left wrist without difficulty.   Signs/symptoms of adverse blood reaction discussed with pt, voiced understanding. Fide Edge NP signed consent with the patient and her daughter today.    Blood pressure (!) 151/78, pulse 81, temperature 97.8 °F (36.6 °C), temperature source Temporal, resp. rate 16, height 1.575 m (5' 2\"), weight 53 kg (116 lb 12.8 oz), SpO2 93 %.    Medications received:   NS @ KVO   Tylenol 650 mg po   Benadryl 25 mg po     0845: 1st unit PRBCs started and infusing without difficulty, will monitor.   1104: 1st unit completed without adverse reaction noted, NS flushing line.     Patient Vitals for the past 24 hrs:   BP Temp Temp src Pulse Resp SpO2 Height Weight   05/10/24 1104 136/63 -- Temporal 58 16 100 % -- --   05/10/24 0900 138/66 98 °F (36.7 °C) Temporal 58 16 100 % -- --   05/10/24 0819 (!) 151/78 97.8 °F (36.6 °C) Temporal 81 16 93 % 1.575 m (5' 2\") 53 kg (116 lb 12.8 oz)     1115 Tolerated transfusion well, no adverse reaction noted. D/C instructions reviewed, copy to pt, voiced understanding.     Patient declined 1 hour post transfusion observation. D/Cd from hospitals ambulatory and in no distress accompanied by her daughter. Next appt 5/15/24.

## 2024-05-15 ENCOUNTER — HOSPITAL ENCOUNTER (OUTPATIENT)
Facility: HOSPITAL | Age: 89
Setting detail: INFUSION SERIES
Discharge: HOME | End: 2024-05-15
Payer: MEDICARE

## 2024-05-15 DIAGNOSIS — D46.9 MDS (MYELODYSPLASTIC SYNDROME) (HCC): ICD-10-CM

## 2024-05-15 DIAGNOSIS — D46.4 REFRACTORY ANEMIA DUE TO MYELODYSPLASTIC SYNDROME (HCC): Primary | ICD-10-CM

## 2024-05-15 LAB
BASOPHILS # BLD: 0 K/UL (ref 0–0.1)
BASOPHILS NFR BLD: 1 % (ref 0–1)
DIFFERENTIAL METHOD BLD: ABNORMAL
EOSINOPHIL # BLD: 0 K/UL (ref 0–0.4)
EOSINOPHIL NFR BLD: 0 % (ref 0–7)
ERYTHROCYTE [DISTWIDTH] IN BLOOD BY AUTOMATED COUNT: 18.9 % (ref 11.5–14.5)
HCT VFR BLD AUTO: 25.2 % (ref 35–47)
HGB BLD-MCNC: 8.1 G/DL (ref 11.5–16)
IMM GRANULOCYTES # BLD AUTO: 0 K/UL (ref 0–0.04)
IMM GRANULOCYTES NFR BLD AUTO: 1 % (ref 0–0.5)
LYMPHOCYTES # BLD: 1 K/UL (ref 0.8–3.5)
LYMPHOCYTES NFR BLD: 35 % (ref 12–49)
MCH RBC QN AUTO: 27.5 PG (ref 26–34)
MCHC RBC AUTO-ENTMCNC: 32.1 G/DL (ref 30–36.5)
MCV RBC AUTO: 85.4 FL (ref 80–99)
MONOCYTES # BLD: 0.1 K/UL (ref 0–1)
MONOCYTES NFR BLD: 2 % (ref 5–13)
NEUTS SEG # BLD: 1.7 K/UL (ref 1.8–8)
NEUTS SEG NFR BLD: 61 % (ref 32–75)
NRBC # BLD: 0 K/UL (ref 0–0.01)
NRBC BLD-RTO: 0 PER 100 WBC
PLATELET # BLD AUTO: 117 K/UL (ref 150–400)
RBC # BLD AUTO: 2.95 M/UL (ref 3.8–5.2)
WBC # BLD AUTO: 2.7 K/UL (ref 3.6–11)

## 2024-05-15 PROCEDURE — 85025 COMPLETE CBC W/AUTO DIFF WBC: CPT

## 2024-05-15 PROCEDURE — 36415 COLL VENOUS BLD VENIPUNCTURE: CPT

## 2024-05-15 NOTE — PROGRESS NOTES
Outpatient Infusion Center Note    [facility] in stable condition for lab draw. Labs drawn peripherally R AC w/o issue and sent to lab.    Recent Results (from the past 24 hour(s))   CBC with Auto Differential    Collection Time: 05/15/24  8:45 AM   Result Value Ref Range    WBC 2.7 (L) 3.6 - 11.0 K/uL    RBC 2.95 (L) 3.80 - 5.20 M/uL    Hemoglobin 8.1 (L) 11.5 - 16.0 g/dL    Hematocrit 25.2 (L) 35.0 - 47.0 %    MCV 85.4 80.0 - 99.0 FL    MCH 27.5 26.0 - 34.0 PG    MCHC 32.1 30.0 - 36.5 g/dL    RDW 18.9 (H) 11.5 - 14.5 %    Platelets 117 (L) 150 - 400 K/uL    Nucleated RBCs 0.0 0  WBC    nRBC 0.00 0.00 - 0.01 K/uL    Neutrophils % 61 32 - 75 %    Lymphocytes % 35 12 - 49 %    Monocytes % 2 (L) 5 - 13 %    Eosinophils % 0 0 - 7 %    Basophils % 1 0 - 1 %    Immature Granulocytes % 1 (H) 0.0 - 0.5 %    Neutrophils Absolute 1.7 (L) 1.8 - 8.0 K/UL    Lymphocytes Absolute 1.0 0.8 - 3.5 K/UL    Monocytes Absolute 0.1 0.0 - 1.0 K/UL    Eosinophils Absolute 0.0 0.0 - 0.4 K/UL    Basophils Absolute 0.0 0.0 - 0.1 K/UL    Immature Granulocytes Absolute 0.0 0.00 - 0.04 K/UL    Differential Type AUTOMATED        Pt tolerated well. D/c home ambulatory in no distress. Pt aware of next appointment scheduled for  5/22/24 at 0800.     Future Appointments   Date Time Provider Department Center   5/22/2024  8:00 AM [facility] MED INF CHAIR 5 [facility]   5/22/2024  8:45 AM Zander Elena MD ONCMR BS AMB   5/29/2024  8:00 AM [facility] CHEMO CHAIR 5 [facility]   6/5/2024  8:00 AM [facility] LAB CHAIR 1 [facility]   6/12/2024  8:00 AM [facility] MED INF CHAIR 6 [facility]   6/12/2024  8:30 AM Zander Elena MD ONC BS AMB   6/19/2024  9:00 AM [facility] FASTTRACK 2 [facility]   6/26/2024  8:00 AM [facility] MED INF CHAIR 5 [facility]   7/3/2024  8:30 AM [facility] LAB CHAIR 1 [facility]   7/10/2024  8:30 AM [facility] LAB CHAIR 1 [facility]   7/10/2024  8:45 AM Zander Elena MD ONC BS AMB   7/17/2024  9:00 AM [facility] LAB CHAIR 1 [facility]

## 2024-05-22 ENCOUNTER — APPOINTMENT (OUTPATIENT)
Facility: HOSPITAL | Age: 89
DRG: 812 | End: 2024-05-22
Payer: MEDICARE

## 2024-05-22 ENCOUNTER — HOSPITAL ENCOUNTER (OUTPATIENT)
Facility: HOSPITAL | Age: 89
Setting detail: INFUSION SERIES
End: 2024-05-22

## 2024-05-22 ENCOUNTER — HOSPITAL ENCOUNTER (INPATIENT)
Facility: HOSPITAL | Age: 89
LOS: 2 days | Discharge: HOSPICE | DRG: 812 | End: 2024-05-24
Attending: EMERGENCY MEDICINE | Admitting: INTERNAL MEDICINE
Payer: MEDICARE

## 2024-05-22 DIAGNOSIS — R55 SYNCOPE AND COLLAPSE: Primary | ICD-10-CM

## 2024-05-22 DIAGNOSIS — N17.9 AKI (ACUTE KIDNEY INJURY) (HCC): ICD-10-CM

## 2024-05-22 DIAGNOSIS — I48.0 PAROXYSMAL ATRIAL FIBRILLATION (HCC): ICD-10-CM

## 2024-05-22 DIAGNOSIS — D64.9 ANEMIA, UNSPECIFIED TYPE: ICD-10-CM

## 2024-05-22 LAB
ALBUMIN SERPL-MCNC: 3 G/DL (ref 3.5–5)
ALBUMIN/GLOB SERPL: 0.9 (ref 1.1–2.2)
ALP SERPL-CCNC: 62 U/L (ref 45–117)
ALT SERPL-CCNC: 13 U/L (ref 12–78)
ANION GAP SERPL CALC-SCNC: 7 MMOL/L (ref 5–15)
AST SERPL-CCNC: 6 U/L (ref 15–37)
BASOPHILS # BLD: 0 K/UL (ref 0–0.1)
BASOPHILS NFR BLD: 1 % (ref 0–1)
BILIRUB SERPL-MCNC: 2 MG/DL (ref 0.2–1)
BUN SERPL-MCNC: 25 MG/DL (ref 6–20)
BUN/CREAT SERPL: 19 (ref 12–20)
CALCIUM SERPL-MCNC: 8.1 MG/DL (ref 8.5–10.1)
CHLORIDE SERPL-SCNC: 102 MMOL/L (ref 97–108)
CO2 SERPL-SCNC: 22 MMOL/L (ref 21–32)
CREAT SERPL-MCNC: 1.29 MG/DL (ref 0.55–1.02)
DIFFERENTIAL METHOD BLD: ABNORMAL
EKG ATRIAL RATE: 71 BPM
EKG DIAGNOSIS: NORMAL
EKG P AXIS: 67 DEGREES
EKG P-R INTERVAL: 190 MS
EKG Q-T INTERVAL: 418 MS
EKG QRS DURATION: 84 MS
EKG QTC CALCULATION (BAZETT): 454 MS
EKG R AXIS: 10 DEGREES
EKG T AXIS: 266 DEGREES
EKG VENTRICULAR RATE: 71 BPM
EOSINOPHIL # BLD: 0 K/UL (ref 0–0.4)
EOSINOPHIL NFR BLD: 0 % (ref 0–7)
ERYTHROCYTE [DISTWIDTH] IN BLOOD BY AUTOMATED COUNT: 19.1 % (ref 11.5–14.5)
GLOBULIN SER CALC-MCNC: 3.2 G/DL (ref 2–4)
GLUCOSE SERPL-MCNC: 137 MG/DL (ref 65–100)
HCT VFR BLD AUTO: 19.8 % (ref 35–47)
HCT VFR BLD AUTO: 21.8 % (ref 35–47)
HGB BLD-MCNC: 6.4 G/DL (ref 11.5–16)
HGB BLD-MCNC: 7.3 G/DL (ref 11.5–16)
HISTORY CHECK: NORMAL
IMM GRANULOCYTES # BLD AUTO: 0 K/UL (ref 0–0.04)
IMM GRANULOCYTES NFR BLD AUTO: 1 % (ref 0–0.5)
LACTATE BLD-SCNC: 1.27 MMOL/L (ref 0.4–2)
LACTATE BLD-SCNC: 2.2 MMOL/L (ref 0.4–2)
LACTATE BLD-SCNC: 2.24 MMOL/L (ref 0.4–2)
LYMPHOCYTES # BLD: 0.5 K/UL (ref 0.8–3.5)
LYMPHOCYTES NFR BLD: 18 % (ref 12–49)
MAGNESIUM SERPL-MCNC: 2.1 MG/DL (ref 1.6–2.4)
MCH RBC QN AUTO: 27 PG (ref 26–34)
MCHC RBC AUTO-ENTMCNC: 32.3 G/DL (ref 30–36.5)
MCV RBC AUTO: 83.5 FL (ref 80–99)
MONOCYTES # BLD: 0.1 K/UL (ref 0–1)
MONOCYTES NFR BLD: 2 % (ref 5–13)
NEUTS SEG # BLD: 1.9 K/UL (ref 1.8–8)
NEUTS SEG NFR BLD: 78 % (ref 32–75)
NRBC # BLD: 0 K/UL (ref 0–0.01)
NRBC BLD-RTO: 0 PER 100 WBC
PLATELET # BLD AUTO: 48 K/UL (ref 150–400)
PLATELET COMMENT: ABNORMAL
POTASSIUM SERPL-SCNC: 3.5 MMOL/L (ref 3.5–5.1)
PROT SERPL-MCNC: 6.2 G/DL (ref 6.4–8.2)
RBC # BLD AUTO: 2.37 M/UL (ref 3.8–5.2)
RBC MORPH BLD: ABNORMAL
SODIUM SERPL-SCNC: 131 MMOL/L (ref 136–145)
TROPONIN I SERPL HS-MCNC: 26 NG/L (ref 0–51)
WBC # BLD AUTO: 2.5 K/UL (ref 3.6–11)

## 2024-05-22 PROCEDURE — 86900 BLOOD TYPING SEROLOGIC ABO: CPT

## 2024-05-22 PROCEDURE — 86850 RBC ANTIBODY SCREEN: CPT

## 2024-05-22 PROCEDURE — 95816 EEG AWAKE AND DROWSY: CPT

## 2024-05-22 PROCEDURE — 84484 ASSAY OF TROPONIN QUANT: CPT

## 2024-05-22 PROCEDURE — 85018 HEMOGLOBIN: CPT

## 2024-05-22 PROCEDURE — 80053 COMPREHEN METABOLIC PANEL: CPT

## 2024-05-22 PROCEDURE — 1100000003 HC PRIVATE W/ TELEMETRY

## 2024-05-22 PROCEDURE — 6370000000 HC RX 637 (ALT 250 FOR IP): Performed by: INTERNAL MEDICINE

## 2024-05-22 PROCEDURE — 83735 ASSAY OF MAGNESIUM: CPT

## 2024-05-22 PROCEDURE — 36415 COLL VENOUS BLD VENIPUNCTURE: CPT

## 2024-05-22 PROCEDURE — 86923 COMPATIBILITY TEST ELECTRIC: CPT

## 2024-05-22 PROCEDURE — 2580000003 HC RX 258: Performed by: EMERGENCY MEDICINE

## 2024-05-22 PROCEDURE — 71045 X-RAY EXAM CHEST 1 VIEW: CPT

## 2024-05-22 PROCEDURE — 30233N1 TRANSFUSION OF NONAUTOLOGOUS RED BLOOD CELLS INTO PERIPHERAL VEIN, PERCUTANEOUS APPROACH: ICD-10-PCS | Performed by: STUDENT IN AN ORGANIZED HEALTH CARE EDUCATION/TRAINING PROGRAM

## 2024-05-22 PROCEDURE — 95816 EEG AWAKE AND DROWSY: CPT | Performed by: INTERNAL MEDICINE

## 2024-05-22 PROCEDURE — 99285 EMERGENCY DEPT VISIT HI MDM: CPT

## 2024-05-22 PROCEDURE — 85014 HEMATOCRIT: CPT

## 2024-05-22 PROCEDURE — 36430 TRANSFUSION BLD/BLD COMPNT: CPT

## 2024-05-22 PROCEDURE — P9016 RBC LEUKOCYTES REDUCED: HCPCS

## 2024-05-22 PROCEDURE — 70450 CT HEAD/BRAIN W/O DYE: CPT

## 2024-05-22 PROCEDURE — 74176 CT ABD & PELVIS W/O CONTRAST: CPT

## 2024-05-22 PROCEDURE — 85025 COMPLETE CBC W/AUTO DIFF WBC: CPT

## 2024-05-22 PROCEDURE — 83605 ASSAY OF LACTIC ACID: CPT

## 2024-05-22 PROCEDURE — 2580000003 HC RX 258: Performed by: INTERNAL MEDICINE

## 2024-05-22 PROCEDURE — 86901 BLOOD TYPING SEROLOGIC RH(D): CPT

## 2024-05-22 RX ORDER — SODIUM CHLORIDE 9 MG/ML
INJECTION, SOLUTION INTRAVENOUS PRN
Status: DISCONTINUED | OUTPATIENT
Start: 2024-05-22 | End: 2024-05-24 | Stop reason: HOSPADM

## 2024-05-22 RX ORDER — ACETAMINOPHEN 325 MG/1
650 TABLET ORAL EVERY 6 HOURS PRN
Status: DISCONTINUED | OUTPATIENT
Start: 2024-05-22 | End: 2024-05-24 | Stop reason: HOSPADM

## 2024-05-22 RX ORDER — ONDANSETRON 2 MG/ML
4 INJECTION INTRAMUSCULAR; INTRAVENOUS EVERY 6 HOURS PRN
Status: DISCONTINUED | OUTPATIENT
Start: 2024-05-22 | End: 2024-05-24 | Stop reason: HOSPADM

## 2024-05-22 RX ORDER — ONDANSETRON 4 MG/1
4 TABLET, ORALLY DISINTEGRATING ORAL EVERY 8 HOURS PRN
Status: DISCONTINUED | OUTPATIENT
Start: 2024-05-22 | End: 2024-05-24 | Stop reason: HOSPADM

## 2024-05-22 RX ORDER — 0.9 % SODIUM CHLORIDE 0.9 %
1000 INTRAVENOUS SOLUTION INTRAVENOUS ONCE
Status: COMPLETED | OUTPATIENT
Start: 2024-05-22 | End: 2024-05-22

## 2024-05-22 RX ORDER — ACETAMINOPHEN 650 MG/1
650 SUPPOSITORY RECTAL EVERY 6 HOURS PRN
Status: DISCONTINUED | OUTPATIENT
Start: 2024-05-22 | End: 2024-05-24 | Stop reason: HOSPADM

## 2024-05-22 RX ORDER — ATORVASTATIN CALCIUM 10 MG/1
10 TABLET, FILM COATED ORAL DAILY
Status: DISCONTINUED | OUTPATIENT
Start: 2024-05-22 | End: 2024-05-22 | Stop reason: SDUPTHER

## 2024-05-22 RX ORDER — SODIUM CHLORIDE 0.9 % (FLUSH) 0.9 %
5-40 SYRINGE (ML) INJECTION EVERY 12 HOURS SCHEDULED
Status: DISCONTINUED | OUTPATIENT
Start: 2024-05-22 | End: 2024-05-24 | Stop reason: HOSPADM

## 2024-05-22 RX ORDER — SODIUM CHLORIDE 9 MG/ML
INJECTION, SOLUTION INTRAVENOUS CONTINUOUS
Status: ACTIVE | OUTPATIENT
Start: 2024-05-22 | End: 2024-05-24

## 2024-05-22 RX ORDER — SODIUM CHLORIDE 0.9 % (FLUSH) 0.9 %
5-40 SYRINGE (ML) INJECTION PRN
Status: DISCONTINUED | OUTPATIENT
Start: 2024-05-22 | End: 2024-05-24 | Stop reason: HOSPADM

## 2024-05-22 RX ORDER — POLYETHYLENE GLYCOL 3350 17 G/17G
17 POWDER, FOR SOLUTION ORAL DAILY PRN
Status: DISCONTINUED | OUTPATIENT
Start: 2024-05-22 | End: 2024-05-24 | Stop reason: HOSPADM

## 2024-05-22 RX ORDER — LATANOPROST 50 UG/ML
1 SOLUTION/ DROPS OPHTHALMIC NIGHTLY
Status: DISCONTINUED | OUTPATIENT
Start: 2024-05-22 | End: 2024-05-24 | Stop reason: HOSPADM

## 2024-05-22 RX ORDER — ATORVASTATIN CALCIUM 10 MG/1
10 TABLET, FILM COATED ORAL NIGHTLY
Status: DISCONTINUED | OUTPATIENT
Start: 2024-05-22 | End: 2024-05-24 | Stop reason: HOSPADM

## 2024-05-22 RX ADMIN — SODIUM CHLORIDE: 9 INJECTION, SOLUTION INTRAVENOUS at 16:13

## 2024-05-22 RX ADMIN — SODIUM CHLORIDE, PRESERVATIVE FREE 10 ML: 5 INJECTION INTRAVENOUS at 20:35

## 2024-05-22 RX ADMIN — SODIUM CHLORIDE 1000 ML: 9 INJECTION, SOLUTION INTRAVENOUS at 09:21

## 2024-05-22 RX ADMIN — ATORVASTATIN CALCIUM 10 MG: 10 TABLET, FILM COATED ORAL at 20:34

## 2024-05-22 NOTE — ED PROVIDER NOTES
[facility] EMERGENCY DEPT  EMERGENCY DEPARTMENT ENCOUNTER    Patient Name: Esther Kaiser  MRN: [id number]  YOB: 1935  Provider: Heladio Israel MD  PCP: Messi Stock MD  Heme/Onc: Dr. Elena  Time/Date of evaluation: 9:04 AM EDT on 5/22/24    History of Presenting Illness     Chief Complaint   Patient presents with    Loss of Consciousness     BIBEMS from home for syncopal episode. Pt was using bathroom and had LOC accompanied by leg shaking. Per EMS ETCO2 17 with RR in 30s; pt in Afib with no history. Hx bone cancer; no complaints at this time.      History Provided by: EMS and Patient   History is limited by: Nothing    HISTORY (Narrative):   Esther Kaiser is a 88 y.o. female with a PMHX of myelofibrosis, chronic anemia, breast cancer, hypercholesterolemia, hypertension, and CVA bone marrow cancer  who presents to the emergency department (room 19) by EMS C/O syncopal episode that occurred after having a bowel movement this morning.  Patient reports generalized weakness and some diarrhea over the past few days.  She denies any fevers, chills, or vomiting.  Her daughter states she has had decreased appetite and has only been eating Jell-O and some applesauce.    Nursing Notes were all reviewed and agreed with or any disagreements were addressed in the HPI.    Past History     PAST MEDICAL HISTORY:  No past medical history on file.    PAST SURGICAL HISTORY:  No past surgical history on file.    FAMILY HISTORY:  No family history on file.    SOCIAL HISTORY:  Social History     Tobacco Use    Smoking status: Never    Smokeless tobacco: Never   Vaping Use    Vaping Use: Never used   Substance Use Topics    Alcohol use: Not Currently    Drug use: Never       MEDICATIONS:  No current facility-administered medications on file prior to encounter.     Current Outpatient Medications on File Prior to Encounter   Medication Sig Dispense Refill    Momelotinib Dihydrochloride (OJJAARA) 200 MG TABS Take 200 mg by  Platelet Comment DECREASED PLATELETS      RBC Comment SCHISTOCYTES  PRESENT        RBC Comment POIKILOCYTOSIS  PRESENT        RBC Comment ANISOCYTOSIS  1+        RBC Comment HYPOCHROMIA  1+        RBC Comment RBC FRAGMENTS     CMP   Result Value Ref Range    Sodium 131 (L) 136 - 145 mmol/L    Potassium 3.5 3.5 - 5.1 mmol/L    Chloride 102 97 - 108 mmol/L    CO2 22 21 - 32 mmol/L    Anion Gap 7 5 - 15 mmol/L    Glucose 137 (H) 65 - 100 mg/dL    BUN 25 (H) 6 - 20 MG/DL    Creatinine 1.29 (H) 0.55 - 1.02 MG/DL    BUN/Creatinine Ratio 19 12 - 20      Est, Glom Filt Rate 40 (L) >60 ml/min/1.73m2    Calcium 8.1 (L) 8.5 - 10.1 MG/DL    Total Bilirubin 2.0 (H) 0.2 - 1.0 MG/DL    ALT 13 12 - 78 U/L    AST 6 (L) 15 - 37 U/L    Alk Phosphatase 62 45 - 117 U/L    Total Protein 6.2 (L) 6.4 - 8.2 g/dL    Albumin 3.0 (L) 3.5 - 5.0 g/dL    Globulin 3.2 2.0 - 4.0 g/dL    Albumin/Globulin Ratio 0.9 (L) 1.1 - 2.2     Troponin   Result Value Ref Range    Troponin, High Sensitivity 26 0 - 51 ng/L   Magnesium   Result Value Ref Range    Magnesium 2.1 1.6 - 2.4 mg/dL   POC Lactic Acid   Result Value Ref Range    POC Lactic Acid 2.24 (HH) 0.40 - 2.00 mmol/L   EKG 12 Lead   Result Value Ref Range    Ventricular Rate 71 BPM    Atrial Rate 71 BPM    P-R Interval 190 ms    QRS Duration 84 ms    Q-T Interval 418 ms    QTc Calculation (Bazett) 454 ms    P Axis 67 degrees    R Axis 10 degrees    T Axis 266 degrees    Diagnosis       Sinus rhythm with premature atrial complexes  T wave abnormality, consider lateral ischemia  No previous ECGs available     TYPE AND SCREEN   Result Value Ref Range    Crossmatch expiration date 05/25/2024,2350     ABO/Rh B POSITIVE     Antibody Screen NEG        RADIOLOGIC STUDIES:   Non x-ray images such as CT, Ultrasound and MRI are read by the radiologist. X-ray images are visualized and preliminarily interpreted by the ED Provider with the findings as listed in the ED Course section below.     Interpretation per

## 2024-05-22 NOTE — CARE COORDINATION
Care Management Initial Assessment       RUR: 18% moderate   Readmission? No  1st IM letter given? No, to be given by Patient Access  1st  letter given: No    Initial note: Chart review completed prior to assessment. CM completed assessment with pt's daughter, Verito Daniel, at bedside. CM introduced self, role of CM, verified demographics to ensure accuracy, and discussed transition of care planning. CM alerted by ED MD proactively that family was seeking transitional care resources. Chart reviewed, noted discussions between pt's daughter and pt's PCP Dr. Messi Stock via [employer], in which daughter requested hospice evaluation on yesterday. Discussed this with daughter on today, daughter reports that pt has been declining (worsening mobility, onset of incontinence, decreased appetite) and family is in agreement that it is time to consider hospice care for pt. Pt resides with daughter in 2 level home with 2 MAHOGANY. Pt remains on first floor of home. Daughter is main caregiver, but is also still working from home. Daughter supports pt with toileting, bathing, meal preparation, medication management, and transportation needs. Pt has supportive granddaughter, Pily, who has been helping as well. Discussed hospice care vs personal care, provided resources for hospice agencies and personal care agencies that service [place] as goal is for pt to return home with daughter. Daughter to review and discuss with family and coordinate caregiver support as needed. Daughter to review hospice options and update CM when preference is known. Informed of freedom of choice.     Daughter to transport pt home at time of d/c. Pharmacy preference is [facility] Care management will continue to be available to assist as transition of care planning needs arise. Full assessment below:       05/22/24 0981   Service Assessment   Patient Orientation Unable to Assess   History Provided By Child/Family  (DaughterVerito  rollator, shower chair, elevated commode)   Potential Assistance Needed Other (Comment)  (Family feels that it is time for hospice care for pt - they have been provided with resources to explore hospice options and private duty care options to relieve caregiver burden from daughter)   Patient expects to be discharged to: House   Condition of Participation: Discharge Planning   The Patient and/or Patient Representative was provided with a Choice of Provider? Patient Representative   Name of the Patient Representative who was provided with the Choice of Provider and agrees with the Discharge Plan?  Verito Rolle   Freedom of Choice list was provided with basic dialogue that supports the patient's individualized plan of care/goals, treatment preferences, and shares the quality data associated with the providers?  Yes         Advance Care Planning     General Advance Care Planning (ACP) Conversation    Date of Conversation: 5/22/2024  Conducted with: Legal next of kin- Shirley Rolle  Other persons present:     Healthcare Decision Maker:   Primary Decision Maker: VERITO ANDRADE - Child - [phone]  Click here to complete Healthcare Decision Makers including selection of the Healthcare Decision Maker Relationship (ie \"Primary\").   Today we documented Decision Maker(s) consistent with Legal Next of Kin hierarchy.    Content/Action Overview:  Has NO ACP documents-Information provided    Length of Voluntary ACP Conversation in minutes:  <16 minutes (Non-Billable)    MARLON Naranjo.  Care Manager, [facility]  [phone]/Available on [employer]

## 2024-05-22 NOTE — CONSULTS
[facility]  at [facility]  [address], [address], [address]  [place] [address]  [phone]    Hematology Note        Patient: Esther Kaiser MRN: [id number]  SSN: [id number]    YOB: 1935  Age: 88 y.o.  Sex: female      Subjective:      Esther Kaiser is a 88 y.o. female who I am seeing for anemia. She has been experiencing progressive dyspnea. Anemia has worsened over the last few months. She has received RBC transfusion which improved the Hgb but it continues to drift downwards. She is fatigued. 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 She is on Momelatinib for 6-8 weeks. She has not needed RBC transfusion for 2 weeks.     Interval History:     5/22/2024  Patient seen at bedside in ED. She is pale and tired. Finishing up EEG. Currently receiving blood transfusion.     Review of Systems:  Constitutional: positive for fatigue  Eyes: negative  Ears, Nose, Mouth, Throat, and Face: negative  Respiratory: negative  Cardiovascular: negative  Gastrointestinal: negative  Genitourinary:negative  Integument/Breast: negative  Hematologic/Lymphatic: negative  Musculoskeletal:negative  Neurological: negative      No past medical history on file.  No past surgical history on file.   No family history on file.  Social History     Tobacco Use    Smoking status: Never    Smokeless tobacco: Never   Substance Use Topics    Alcohol use: Not Currently      Prior to Admission medications    Medication Sig Start Date End Date Taking? Authorizing Provider   Momelotinib Dihydrochloride (OJJAARA) 200 MG TABS Take 200 mg by mouth daily 2/9/24   Roberta Oliver APRN - NP   atorvastatin (LIPITOR) 10 MG tablet Take 1 tablet by mouth daily 1/10/24   Messi Stock MD   pantoprazole (PROTONIX) 40 MG tablet Take 1 tablet by mouth in the morning and at bedtime  Patient not taking: Reported on 3/14/2024 12/5/23   Messi Stock MD   Travoprost, MAGY        Signed by: Dell Muhammad, APRN - NP                     May 22, 2024

## 2024-05-22 NOTE — PROCEDURES
EEG REPORT    Patient Name: Esther Kaiser  : 1935  Age: 88 y.o.    Ordering physician: No referring provider defined for this encounter.  Date of EEG start time: 2024 at 2:09 PM  Date of EEG end time: 2024 at 2:30 PM  EEG procedure number: [id number]  Diagnosis: Syncope  Interpreting physician: Aleksandra Batista D.O.      Procedure: EEG    CLINICAL INDICATION: The patient is a 88 y.o. female who is being evaluated for baseline electro cerebral activities and to rule out seizure focus.      Current Facility-Administered Medications   Medication Dose Route Frequency    0.9 % sodium chloride infusion   IntraVENous PRN    atorvastatin (LIPITOR) tablet 10 mg  10 mg Oral Daily    sodium chloride flush 0.9 % injection 5-40 mL  5-40 mL IntraVENous 2 times per day    sodium chloride flush 0.9 % injection 5-40 mL  5-40 mL IntraVENous PRN    0.9 % sodium chloride infusion   IntraVENous PRN    ondansetron (ZOFRAN-ODT) disintegrating tablet 4 mg  4 mg Oral Q8H PRN    Or    ondansetron (ZOFRAN) injection 4 mg  4 mg IntraVENous Q6H PRN    polyethylene glycol (GLYCOLAX) packet 17 g  17 g Oral Daily PRN    acetaminophen (TYLENOL) tablet 650 mg  650 mg Oral Q6H PRN    Or    acetaminophen (TYLENOL) suppository 650 mg  650 mg Rectal Q6H PRN    0.9 % sodium chloride infusion   IntraVENous Continuous     Current Outpatient Medications   Medication Sig    Momelotinib Dihydrochloride (OJJAARA) 200 MG TABS Take 200 mg by mouth daily    atorvastatin (LIPITOR) 10 MG tablet Take 1 tablet by mouth daily    pantoprazole (PROTONIX) 40 MG tablet Take 1 tablet by mouth in the morning and at bedtime (Patient not taking: Reported on 3/14/2024)    Travoprost, MAGY Free, (TRAVATAN Z) 0.004 % SOLN ophthalmic solution            DESCRIPTION OF PROCEDURE:   This is a digitally recorded electroencephalogram.  Electrodes were applied in accordance with the international 10-20 system of electrode placement.    18 channels of scalp EEG are  recorded.  A channel was used for EoG.  Another channel was used for ECG.   The data is stored digitally and reviewed in reformatted montages for optimal display.  EEG was reviewed in both bipolar and referential montages.    Description of Activity:  The background of this recording contains mixed frequencies in the delta, theta, alpha range. The posterior dominant rhythm reaches 10 hz and is seen symmetrically in both hemispheres.  Amplitudes are symmetric in both hemispheres with normal voltages.  The anterior-posterior gradient is well-organized.  There is variability, continuity, reactivity present.    Throughout the recording, there were no clear areas of focal slowing nor spike or spike-and-wave discharges seen.     Hyperventilation was not performed. Photic stimulation produced no response in the posterior head regions.     During drowsiness, there is attenuation of the posterior dominant rhythm, roving horizontal eye movements and slower frequencies in the theta range.     During the recording, the patient did not achieve stage II sleep.    There are no epileptiform discharges, electrographic seizures or clinical events of concern.     Single channel EKG shows regular rate and rhythm.    Clinical Interpretation:  This EEG, performed during wakefulness and drowsiness/sleep, is normal.  There was no clear focal abnormalities or epileptiform activity. The absence of epileptiform activity neither excludes nor supports the diagnosis of epilepsy. If further suspicion persists, would recommend obtaining a continuous EEG study. Clinical correlation recommended.     Aleksandra Batista D.O.

## 2024-05-22 NOTE — H&P
POIKILOCYTOSIS  PRESENT        RBC Comment ANISOCYTOSIS  1+        RBC Comment HYPOCHROMIA  1+        RBC Comment RBC FRAGMENTS     CMP    Collection Time: 05/22/24  9:15 AM   Result Value Ref Range    Sodium 131 (L) 136 - 145 mmol/L    Potassium 3.5 3.5 - 5.1 mmol/L    Chloride 102 97 - 108 mmol/L    CO2 22 21 - 32 mmol/L    Anion Gap 7 5 - 15 mmol/L    Glucose 137 (H) 65 - 100 mg/dL    BUN 25 (H) 6 - 20 MG/DL    Creatinine 1.29 (H) 0.55 - 1.02 MG/DL    BUN/Creatinine Ratio 19 12 - 20      Est, Glom Filt Rate 40 (L) >60 ml/min/1.73m2    Calcium 8.1 (L) 8.5 - 10.1 MG/DL    Total Bilirubin 2.0 (H) 0.2 - 1.0 MG/DL    ALT 13 12 - 78 U/L    AST 6 (L) 15 - 37 U/L    Alk Phosphatase 62 45 - 117 U/L    Total Protein 6.2 (L) 6.4 - 8.2 g/dL    Albumin 3.0 (L) 3.5 - 5.0 g/dL    Globulin 3.2 2.0 - 4.0 g/dL    Albumin/Globulin Ratio 0.9 (L) 1.1 - 2.2     Troponin    Collection Time: 05/22/24  9:15 AM   Result Value Ref Range    Troponin, High Sensitivity 26 0 - 51 ng/L   Magnesium    Collection Time: 05/22/24  9:15 AM   Result Value Ref Range    Magnesium 2.1 1.6 - 2.4 mg/dL   TYPE AND SCREEN    Collection Time: 05/22/24  9:15 AM   Result Value Ref Range    Crossmatch expiration date 05/25/2024,3082     ABO/Rh B POSITIVE     Antibody Screen NEG     Unit Number [id number]     Product Code Blood Bank RC LR,1     Unit Divison 00     Dispense Status Blood Bank ISSUED     Unit Issue Date/Time 142733315406     Product Code Blood Bank O7023Q17     Blood Bank Unit Type and Rh B NEG     Blood Bank ISBT Product Blood Type 1700     Blood Bank Blood Product Expiration Date [id number]     Crossmatch Result Compatible    POC Lactic Acid    Collection Time: 05/22/24  9:20 AM   Result Value Ref Range    POC Lactic Acid 2.24 (HH) 0.40 - 2.00 mmol/L   PREPARE RBC (CROSSMATCH), 1 Units    Collection Time: 05/22/24 11:15 AM   Result Value Ref Range    History Check Historical check performed    POC Lactic Acid    Collection Time: 05/22/24  11:41 AM   Result Value Ref Range    POC Lactic Acid 2.20 (HH) 0.40 - 2.00 mmol/L         XR CHEST PORTABLE    Result Date: 5/22/2024  EXAM: Chest radiograph INDICATION: Syncope COMPARISON: none TECHNIQUE: Upright portable chest AP view FINDINGS: Cardiomediastinal silhouette is within normal limits. Lungs and pleural spaces are grossly clear.     No acute findings.      _______________________________________________________________________    TOTAL TIME:  76 Minutes    Critical Care Provided     Minutes non procedure based    Signed: Fitz Steiner MD    Procedures: see electronic medical records for all procedures/Xrays and details which were not copied into this note but were reviewed prior to creation of Plan.

## 2024-05-22 NOTE — ED NOTES
Verbal shift change report given to DEREK Madsen (oncoming nurse) by DEREK Wood (offgoing nurse). Report included the following information Nurse Handoff Report, ED Encounter Summary, ED SBAR, Intake/Output, MAR, Recent Results, and Neuro Assessment.

## 2024-05-23 LAB
ABO + RH BLD: NORMAL
ANION GAP SERPL CALC-SCNC: 8 MMOL/L (ref 5–15)
BASOPHILS # BLD: 0 K/UL (ref 0–0.1)
BASOPHILS NFR BLD: 0 % (ref 0–1)
BLD PROD TYP BPU: NORMAL
BLOOD BANK BLOOD PRODUCT EXPIRATION DATE: NORMAL
BLOOD BANK DISPENSE STATUS: NORMAL
BLOOD BANK ISBT PRODUCT BLOOD TYPE: 1700
BLOOD BANK PRODUCT CODE: NORMAL
BLOOD BANK UNIT TYPE AND RH: NORMAL
BLOOD GROUP ANTIBODIES SERPL: NORMAL
BPU ID: NORMAL
BUN SERPL-MCNC: 20 MG/DL (ref 6–20)
BUN/CREAT SERPL: 19 (ref 12–20)
CALCIUM SERPL-MCNC: 8 MG/DL (ref 8.5–10.1)
CHLORIDE SERPL-SCNC: 105 MMOL/L (ref 97–108)
CO2 SERPL-SCNC: 18 MMOL/L (ref 21–32)
CREAT SERPL-MCNC: 1.04 MG/DL (ref 0.55–1.02)
CROSSMATCH RESULT: NORMAL
DIFFERENTIAL METHOD BLD: ABNORMAL
EOSINOPHIL # BLD: 0 K/UL (ref 0–0.4)
EOSINOPHIL NFR BLD: 0 % (ref 0–7)
ERYTHROCYTE [DISTWIDTH] IN BLOOD BY AUTOMATED COUNT: 17.9 % (ref 11.5–14.5)
GLUCOSE SERPL-MCNC: 116 MG/DL (ref 65–100)
HCT VFR BLD AUTO: 23.1 % (ref 35–47)
HGB BLD-MCNC: 7.7 G/DL (ref 11.5–16)
IMM GRANULOCYTES # BLD AUTO: 0 K/UL (ref 0–0.04)
IMM GRANULOCYTES NFR BLD AUTO: 0 % (ref 0–0.5)
LACTATE SERPL-SCNC: 2.5 MMOL/L (ref 0.4–2)
LYMPHOCYTES # BLD: 0.3 K/UL (ref 0.8–3.5)
LYMPHOCYTES NFR BLD: 9 % (ref 12–49)
MCH RBC QN AUTO: 27.2 PG (ref 26–34)
MCHC RBC AUTO-ENTMCNC: 33.3 G/DL (ref 30–36.5)
MCV RBC AUTO: 81.6 FL (ref 80–99)
MONOCYTES # BLD: 0 K/UL (ref 0–1)
MONOCYTES NFR BLD: 1 % (ref 5–13)
NEUTS SEG # BLD: 3.5 K/UL (ref 1.8–8)
NEUTS SEG NFR BLD: 90 % (ref 32–75)
NRBC # BLD: 0 K/UL (ref 0–0.01)
NRBC BLD-RTO: 0 PER 100 WBC
PLATELET # BLD AUTO: 44 K/UL (ref 150–400)
POTASSIUM SERPL-SCNC: 3.8 MMOL/L (ref 3.5–5.1)
PROCALCITONIN SERPL-MCNC: 0.25 NG/ML
RBC # BLD AUTO: 2.83 M/UL (ref 3.8–5.2)
RBC MORPH BLD: ABNORMAL
SODIUM SERPL-SCNC: 131 MMOL/L (ref 136–145)
SPECIMEN EXP DATE BLD: NORMAL
TROPONIN I SERPL HS-MCNC: 30 NG/L (ref 0–51)
UNIT DIVISION: 0
UNIT ISSUE DATE/TIME: NORMAL
WBC # BLD AUTO: 3.8 K/UL (ref 3.6–11)

## 2024-05-23 PROCEDURE — 93005 ELECTROCARDIOGRAM TRACING: CPT | Performed by: INTERNAL MEDICINE

## 2024-05-23 PROCEDURE — 87040 BLOOD CULTURE FOR BACTERIA: CPT

## 2024-05-23 PROCEDURE — 83605 ASSAY OF LACTIC ACID: CPT

## 2024-05-23 PROCEDURE — 80048 BASIC METABOLIC PNL TOTAL CA: CPT

## 2024-05-23 PROCEDURE — 6370000000 HC RX 637 (ALT 250 FOR IP): Performed by: INTERNAL MEDICINE

## 2024-05-23 PROCEDURE — 6360000002 HC RX W HCPCS: Performed by: INTERNAL MEDICINE

## 2024-05-23 PROCEDURE — 84484 ASSAY OF TROPONIN QUANT: CPT

## 2024-05-23 PROCEDURE — 2580000003 HC RX 258: Performed by: INTERNAL MEDICINE

## 2024-05-23 PROCEDURE — 36415 COLL VENOUS BLD VENIPUNCTURE: CPT

## 2024-05-23 PROCEDURE — 81001 URINALYSIS AUTO W/SCOPE: CPT

## 2024-05-23 PROCEDURE — 85025 COMPLETE CBC W/AUTO DIFF WBC: CPT

## 2024-05-23 PROCEDURE — 1100000003 HC PRIVATE W/ TELEMETRY

## 2024-05-23 PROCEDURE — 84145 PROCALCITONIN (PCT): CPT

## 2024-05-23 RX ORDER — LEVOFLOXACIN 5 MG/ML
750 INJECTION, SOLUTION INTRAVENOUS
Status: DISCONTINUED | OUTPATIENT
Start: 2024-05-23 | End: 2024-05-24 | Stop reason: HOSPADM

## 2024-05-23 RX ORDER — METRONIDAZOLE 500 MG/100ML
500 INJECTION, SOLUTION INTRAVENOUS EVERY 8 HOURS
Status: DISCONTINUED | OUTPATIENT
Start: 2024-05-23 | End: 2024-05-24 | Stop reason: HOSPADM

## 2024-05-23 RX ORDER — SODIUM CHLORIDE, SODIUM LACTATE, POTASSIUM CHLORIDE, AND CALCIUM CHLORIDE .6; .31; .03; .02 G/100ML; G/100ML; G/100ML; G/100ML
500 INJECTION, SOLUTION INTRAVENOUS ONCE
Status: COMPLETED | OUTPATIENT
Start: 2024-05-23 | End: 2024-05-23

## 2024-05-23 RX ADMIN — LATANOPROST 1 DROP: 50 SOLUTION OPHTHALMIC at 03:10

## 2024-05-23 RX ADMIN — SODIUM CHLORIDE: 9 INJECTION, SOLUTION INTRAVENOUS at 23:49

## 2024-05-23 RX ADMIN — METRONIDAZOLE 500 MG: 500 INJECTION, SOLUTION INTRAVENOUS at 21:38

## 2024-05-23 RX ADMIN — LEVOFLOXACIN 750 MG: 750 INJECTION, SOLUTION INTRAVENOUS at 10:46

## 2024-05-23 RX ADMIN — ATORVASTATIN CALCIUM 10 MG: 10 TABLET, FILM COATED ORAL at 21:38

## 2024-05-23 RX ADMIN — ACETAMINOPHEN 650 MG: 325 TABLET ORAL at 09:08

## 2024-05-23 RX ADMIN — SODIUM CHLORIDE, POTASSIUM CHLORIDE, SODIUM LACTATE AND CALCIUM CHLORIDE 500 ML: 600; 310; 30; 20 INJECTION, SOLUTION INTRAVENOUS at 08:57

## 2024-05-23 RX ADMIN — METRONIDAZOLE 500 MG: 500 INJECTION, SOLUTION INTRAVENOUS at 15:40

## 2024-05-23 NOTE — ACP (ADVANCE CARE PLANNING)
Advance Care Planning     Advance Care Planning Inpatient Note  [facility] Department    Today's Date: 5/23/2024  Unit: [facility] 3 MED TELE    Received request from HealthCare Provider.  Upon review of chart and communication with care team, patient's decision making abilities are not in question.. Patient and Child/Children was/were present in the room during visit.    Goals of ACP Conversation:  Discuss advance care planning documents  Facilitate a discussion related to patient's goals of care as they align with the patient's values and beliefs.    Health Care Decision Makers:       Primary Decision Maker: RAYMONDALEK - Child - [phone]  Summary:  Completed New Documents      Advance Care Planning Documents (Patient Wishes):  Living Will/Advance Directive     Assessment:   received an consult order from the staff for an Advance Medical Directive for the patient.  reviewed the forms with her and her daughter Nila. She completed the AMD process.    Primary Agent- Nila Daniel [phone] (daughter)  Successor Agent- Pily Terrazas [phone] (grand daughter)   provided a presence, encouragement and empathetic listening.     Interventions:  Provided education on documents for clarity and greater understanding  Discussed and provided education on state decision maker hierarchy  Assisted in the completion of documents according to patient's wishes at this time  Encouraged ongoing ACP conversation with future decision makers and loved ones    Care Preferences Communicated:   No    Outcomes/Plan:  ACP Discussion: Completed  New advance directive completed.  Returned original document(s) to patient, as well as copies for distribution to appointed agents  Copy of advance directive given to staff to scan into medical record.    Electronically signed by Chaplain Justin on 5/23/2024 at 3:19 PM

## 2024-05-23 NOTE — PROGRESS NOTES
Occupational Therapy note:    Order received and acknowledged. Attempted to see patient for OT kamilla. She was received sitting in recliner chair with family and PT present in room. Per PT, family reporting patient has been up into bathroom with staff and appears at her functional baseline for ADLs. Family also reported they are considering palliative vs hospice services at home with no therapy needs at this time. They are currently working on obtaining DME needed for home including BSC. Family and therapists in agreement for therapy to sign off at this time. Please re-consult if any functional changes during stay.    Ines Joyce, OTR/L, OTD

## 2024-05-23 NOTE — CONSULTS
Pt seen and examined.     Full consult dictated.     No cardiac recc at this time    Conservative Rx     Family considering hospice    Will see back on request

## 2024-05-23 NOTE — PROGRESS NOTES
questions answered        Medical Decision Making:   I personally reviewed labs: CBC, BMP  I personally reviewed imaging: CT abdomen and pelvis  I personally reviewed EKG:  Toxic drug monitoring: Monitor for QTc while on Levaquin  Discussed case with: Hematology team   Code Status:dnr  DVT Prophylaxis: SCDs due to anemia and thrombocytopenia  Baseline: From home, with gradual decline in the last 1 week or so    Subjective:     Chief Complaint / Reason for Physician Visit  \"Follow-up syncopal episode\".  Discussed with RN events overnight.   Patient reporting feeling better  Had a low-grade temperature this morning  No further diarrhea    Objective:     VITALS:   Last 24hrs VS reviewed since prior progress note. Most recent are:  Patient Vitals for the past 24 hrs:   BP Temp Temp src Pulse Resp SpO2 Height Weight   05/23/24 1045 -- 98.4 °F (36.9 °C) -- -- -- -- -- --   05/23/24 0845 (!) 108/52 100.4 °F (38 °C) Oral 82 18 96 % -- --   05/22/24 2009 (!) 114/56 97.5 °F (36.4 °C) Oral 60 18 97 % -- --   05/22/24 1540 -- -- -- -- -- -- 1.575 m (5' 2.01\") 52.2 kg (115 lb)   05/22/24 1530 -- -- -- -- -- -- -- 52.2 kg (115 lb)   05/22/24 1526 (!) 122/53 97.7 °F (36.5 °C) Oral 58 16 98 % -- --   05/22/24 1452 (!) 110/54 -- -- 67 24 99 % -- --         Intake/Output Summary (Last 24 hours) at 5/23/2024 1445  Last data filed at 5/22/2024 1709  Gross per 24 hour   Intake 287.5 ml   Output 1 ml   Net 286.5 ml        I had a face to face encounter and independently examined this patient on 5/23/2024, as outlined below:  PHYSICAL EXAM:  General: Alert, cooperative  EENT:  EOMI. Anicteric sclerae.  Resp:  CTA bilaterally, no wheezing or rales.  No accessory muscle use  CV:  Regular  rhythm,  No edema  GI:  Soft, Non distended, Non tender.  +Bowel sounds  Neurologic:  Alert and oriented X 3, normal speech,   Psych:   Good insight. Not anxious nor agitated  Skin:  No rashes.  No jaundice    Reviewed most current lab test results and  cultures  YES  Reviewed most current radiology test results   YES  Review and summation of old records today    NO  Reviewed patient's current orders and MAR    YES  PMH/SH reviewed - no change compared to H&P    Procedures: see electronic medical records for all procedures/Xrays and details which were not copied into this note but were reviewed prior to creation of Plan.      LABS:  I reviewed today's most current labs and imaging studies.  Pertinent labs include:  Recent Labs     05/22/24  0915 05/22/24  1715 05/23/24  0510   WBC 2.5*  --  3.8   HGB 6.4* 7.3* 7.7*   HCT 19.8* 21.8* 23.1*   PLT 48*  --  44*     Recent Labs     05/22/24  0915 05/23/24  0510   * 131*   K 3.5 3.8    105   CO2 22 18*   GLUCOSE 137* 116*   BUN 25* 20   CREATININE 1.29* 1.04*   CALCIUM 8.1* 8.0*   MG 2.1  --    BILITOT 2.0*  --    AST 6*  --    ALT 13  --        Signed: Nancy Castaneda MD

## 2024-05-23 NOTE — CONSULTS
[facility]              [address] [place] [address]                              CONSULTATION      PATIENT NAME: WILLA HUMPHREY                 : 1935  MED REC NO: [id number]                       ROOM: 3234  ACCOUNT NO: [id number]                       ADMIT DATE: 2024  PROVIDER: Shun Aquino MD    DATE OF SERVICE:  2024    ATTENDING PHYSICIAN:  Nancy Castaneda MD    REASON FOR CONSULTATION:  Evaluate atrial fibrillation.    CHIEF COMPLAINT:  The patient voices no chief complaint.    HISTORY OF PRESENT ILLNESS:  The patient is an 88-year-old female with a history of paroxysmal atrial arrhythmias and tachy-michele syndrome.  She apparently has no prior cardiac history.  She is followed by Cardiology at [facility].  No history of coronary artery disease or congestive heart failure.  She was seen there earlier this year by ***.  She had a monitor done, which showed episodes of atrial tachycardia as well as bradycardia.  A pacemaker considered and the patient was supposed to follow up as an outpatient regarding this.    The patient has a history of myeloproliferative disorder and thrombocytopenia.  She was admitted after a syncopal episode as well as diarrhea.  She has had a low-grade fever and is on broad-spectrum IV antibiotics.  Her lactic acid was mildly elevated.  On telemetry, she was noted to have episodes of atrial fibrillation and we were asked to see the patient for evaluation.    The patient is quite hard of hearing and is a limited historian.  According to her granddaughter, the family is considering hospice.  It is the family's wish that no aggressive cardiac treatment be considered and they do not want to consider a pacemaker.  The patient has no other complaints at this time.    PAST MEDICAL HISTORY:  As noted above.  Gastroesophageal reflux, myeloproliferative disorder, prior CVA.    PRIOR SURGERY:  None

## 2024-05-23 NOTE — PLAN OF CARE
Problem: Discharge Planning  Goal: Discharge to home or other facility with appropriate resources  5/23/2024 0006 by Kenny Yepez RN  Outcome: Progressing  5/22/2024 1748 by Naa Calderón RN  Outcome: Progressing     Problem: Safety - Adult  Goal: Free from fall injury  5/23/2024 0006 by Kenny Yepez RN  Outcome: Progressing  5/22/2024 1748 by Naa Calderón RN  Outcome: Progressing     Problem: ABCDS Injury Assessment  Goal: Absence of physical injury  5/23/2024 0006 by Kenny Yepez RN  Outcome: Progressing  5/22/2024 1748 by Naa Calderón RN  Outcome: Progressing

## 2024-05-23 NOTE — PROGRESS NOTES
[facility]  at [facility]  [address], [address], [address]  [place] [address]  [phone]    Hematology Note        Patient: Esther Kaiser MRN: [id number]  SSN: [id number]    YOB: 1935  Age: 88 y.o.  Sex: female      Subjective:      Esther Kaiser is a 88 y.o. female who I am seeing for anemia. She has been experiencing progressive dyspnea. Anemia has worsened over the last few months. She has received RBC transfusion which improved the Hgb but it continues to drift downwards. She is fatigued. 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 She is on Momelatinib for 6-8 weeks. She has not needed RBC transfusion for 2 weeks.     Interval History:     5/22/2024  Patient seen at bedside in ED. She is pale and tired. Finishing up EEG. Currently receiving blood transfusion.     5/23/2024  Patient seen at bedside with daughter. Patient wishes to go home today. Reports he feels much better.     Review of Systems:  Constitutional: positive for fatigue  Eyes: negative  Ears, Nose, Mouth, Throat, and Face: negative  Respiratory: negative  Cardiovascular: negative  Gastrointestinal: negative  Genitourinary:negative  Integument/Breast: negative  Hematologic/Lymphatic: negative  Musculoskeletal:negative  Neurological: negative      History reviewed. No pertinent past medical history.  No past surgical history on file.   No family history on file.  Social History     Tobacco Use    Smoking status: Never    Smokeless tobacco: Never   Substance Use Topics    Alcohol use: Not Currently      Prior to Admission medications    Medication Sig Start Date End Date Taking? Authorizing Provider   Momelotinib Dihydrochloride (OJJAARA) 200 MG TABS Take 200 mg by mouth daily 2/9/24   Roberta Oliver APRN - NP   atorvastatin (LIPITOR) 10 MG tablet Take 1 tablet by mouth daily 1/10/24   Messi Stock MD   pantoprazole (PROTONIX) 40 MG tablet Take 1  [facility],.      Plan:       1. Transfuse RBC to keep hgb > 7   2. Momelatinib 200 mg daily  3. Thrombocytopenia likely related to inflammatory process with recent diarrhea   4. Discharge per hospitalist team, will continue close OP follow       Signed by: MICHELINE Aguilera - NP                     May 23, 2024

## 2024-05-23 NOTE — PROGRESS NOTES
Physical Therapy    Orders acknowledged. Chart reviewed. Pt cleared by nursing to mobilize. Received patient sitting up in chair with multiple family members present. Family reporting patient has been up to the bathroom with staff and feel that she is at her current baseline for mobility. Also reported that they are considering palliative v. Hospice care at home at this time. Spoke about DME, family is looking to purchase bedside commode, provided resources. Family agrees no PT evaluation is needed at this time. Please re-consult if there are any changes to her functional mobility or concerns for discharge.    Michelle Ron, PT, DPT

## 2024-05-23 NOTE — PROGRESS NOTES
Spiritual Care Assessment/Progress Note  [facility]    Name: Esther Kaiser MRN: [id number]    Age: 88 y.o.     Sex: female   Language: English     Date: 5/23/2024            Total Time Calculated: 83 min              Spiritual Assessment begun in [facility] 3 MED TELE  Service Provided For: Patient and family together  Referral/Consult From: Nurse  Encounter Overview/Reason: Advance Care Planning    Spiritual beliefs:      [x] Involved in a alfred tradition/spiritual practice:      [] Supported by a alfred community:      [] Claims no spiritual orientation:      [] Seeking spiritual identity:           [] Adheres to an individual form of spirituality:      [] Not able to assess:                Identified resources for coping and support system:   Support System: Children, Family members       [x] Prayer                  [] Devotional reading               [] Music                  [] Guided Imagery     [] Pet visits                                        [] Other: (COMMENT)     Specific area/focus of visit   Encounter:    Crisis:    Spiritual/Emotional needs: Type: Spiritual Support  Ritual, Rites and Sacraments:    Grief, Loss, and Adjustments:    Ethics/Mediation:    Behavioral Health:    Palliative Care:    Advance Care Planning:      Plan/Referrals: Continue to visit, (comment), Continue Support (comment)    Narrative:  received an consult order from the staff for an Advance Medical Directive for the patient.  reviewed the forms with her and her daughter Nila. She completed the AMD process.    Primary Agent- Nila Daniel [phone] (daughter)  Successor Agent- Pily Guerraucks [phone] (grand daughter)   provided a presence, encouragement and empathetic listening.     Rev. GEOVANNA Anderson  [facility]   Paging Service [phone])

## 2024-05-23 NOTE — PROGRESS NOTES
Physician Progress Note      PATIENT:               WILLA HUMPHREY  CSN #:                  [id number]  :                       1935  ADMIT DATE:       2024 8:48 AM  DISCH DATE:  RESPONDING  PROVIDER #:        Fitz Steiner MD          QUERY TEXT:    Pt admitted with syncope and has Acute on chronic anemia documented. If   possible, please document in progress notes and discharge summary further   specificity regarding the acuity and type of anemia:    The medical record reflects the following:  Risk Factors: bone marrow cancer, Pancytopenia    Clinical Indicators:  > Per H&P- Acute on chronic anemia  Baseline hemoglobin is around 7-8.  Hemoglobin is now 6.4.  Check FOBT.  PRBC   already ordered so iron studies are not being ordered.  Transfuse to keep hemoglobin more than 7  Hematology consulted    Treatment: receiving hematology consult, transfuse to keep hemoglobin more   than 7    Ban Gruber RN, BSN, CRCR  [address] [place] [address]  [email or web address]  Options provided:  -- Anemia due to acute blood loss  -- Anemia due to chronic blood loss  -- Anemia due to acute on chronic blood loss  -- Anemia due to iron deficiency  -- Anemia due to bone marrow cancer  -- Other - I will add my own diagnosis  -- Disagree - Not applicable / Not valid  -- Disagree - Clinically unable to determine / Unknown  -- Refer to Clinical Documentation Reviewer    PROVIDER RESPONSE TEXT:    This patient has acute blood loss anemia.    Query created by: Ban Gruber on 2024 10:41 AM      QUERY TEXT:    Pt admitted with episode of passing out, likely syncope documented per H&P. If   possible, please document in progress notes and discharge summary after study   the etiology of the syncope:      The medical record reflects the following:  Risk Factors: bone marrow cancer, Pancytopenia, anemia    Clinical Indicators:  > Per H&P- Episode of passing out, likely syncope but will need to rule out

## 2024-05-23 NOTE — PLAN OF CARE
Problem: Discharge Planning  Goal: Discharge to home or other facility with appropriate resources  5/23/2024 0917 by Naa Calderón RN  Outcome: Progressing  5/23/2024 0006 by Kenny Yepez RN  Outcome: Progressing     Problem: Safety - Adult  Goal: Free from fall injury  5/23/2024 0917 by Naa Calderón RN  Outcome: Progressing  5/23/2024 0006 by Kenny Yepez RN  Outcome: Progressing     Problem: ABCDS Injury Assessment  Goal: Absence of physical injury  5/23/2024 0917 by Naa Calderón RN  Outcome: Progressing  5/23/2024 0006 by Kenny Yepez RN  Outcome: Progressing

## 2024-05-24 ENCOUNTER — APPOINTMENT (OUTPATIENT)
Facility: HOSPITAL | Age: 89
DRG: 812 | End: 2024-05-24
Attending: INTERNAL MEDICINE
Payer: MEDICARE

## 2024-05-24 VITALS
BODY MASS INDEX: 21.16 KG/M2 | RESPIRATION RATE: 18 BRPM | TEMPERATURE: 98.9 F | DIASTOLIC BLOOD PRESSURE: 61 MMHG | WEIGHT: 115 LBS | HEART RATE: 58 BPM | HEIGHT: 62 IN | SYSTOLIC BLOOD PRESSURE: 123 MMHG | OXYGEN SATURATION: 95 %

## 2024-05-24 LAB
ANION GAP SERPL CALC-SCNC: 5 MMOL/L (ref 5–15)
APPEARANCE UR: CLEAR
BACTERIA URNS QL MICRO: NEGATIVE /HPF
BASOPHILS # BLD: 0 K/UL (ref 0–0.1)
BASOPHILS NFR BLD: 0 % (ref 0–1)
BILIRUB UR QL: NEGATIVE
BUN SERPL-MCNC: 13 MG/DL (ref 6–20)
BUN/CREAT SERPL: 16 (ref 12–20)
CALCIUM SERPL-MCNC: 7.7 MG/DL (ref 8.5–10.1)
CHLORIDE SERPL-SCNC: 105 MMOL/L (ref 97–108)
CO2 SERPL-SCNC: 20 MMOL/L (ref 21–32)
COLOR UR: ABNORMAL
CREAT SERPL-MCNC: 0.81 MG/DL (ref 0.55–1.02)
DIFFERENTIAL METHOD BLD: ABNORMAL
EKG ATRIAL RATE: 68 BPM
EKG DIAGNOSIS: NORMAL
EKG P AXIS: 28 DEGREES
EKG P-R INTERVAL: 172 MS
EKG Q-T INTERVAL: 444 MS
EKG QRS DURATION: 74 MS
EKG QTC CALCULATION (BAZETT): 472 MS
EKG R AXIS: -7 DEGREES
EKG T AXIS: 28 DEGREES
EKG VENTRICULAR RATE: 68 BPM
EOSINOPHIL # BLD: 0 K/UL (ref 0–0.4)
EOSINOPHIL NFR BLD: 0 % (ref 0–7)
EPITH CASTS URNS QL MICRO: ABNORMAL /LPF
ERYTHROCYTE [DISTWIDTH] IN BLOOD BY AUTOMATED COUNT: 17.7 % (ref 11.5–14.5)
GLUCOSE SERPL-MCNC: 92 MG/DL (ref 65–100)
GLUCOSE UR STRIP.AUTO-MCNC: NEGATIVE MG/DL
HCT VFR BLD AUTO: 22.5 % (ref 35–47)
HGB BLD-MCNC: 7.3 G/DL (ref 11.5–16)
HGB UR QL STRIP: ABNORMAL
HYALINE CASTS URNS QL MICRO: ABNORMAL /LPF (ref 0–2)
IMM GRANULOCYTES # BLD AUTO: 0 K/UL (ref 0–0.04)
IMM GRANULOCYTES NFR BLD AUTO: 0 % (ref 0–0.5)
KETONES UR QL STRIP.AUTO: NEGATIVE MG/DL
LEUKOCYTE ESTERASE UR QL STRIP.AUTO: NEGATIVE
LYMPHOCYTES # BLD: 0.4 K/UL (ref 0.8–3.5)
LYMPHOCYTES NFR BLD: 14 % (ref 12–49)
MCH RBC QN AUTO: 27.2 PG (ref 26–34)
MCHC RBC AUTO-ENTMCNC: 32.4 G/DL (ref 30–36.5)
MCV RBC AUTO: 84 FL (ref 80–99)
MONOCYTES # BLD: 0.1 K/UL (ref 0–1)
MONOCYTES NFR BLD: 2 % (ref 5–13)
NEUTS SEG # BLD: 2.5 K/UL (ref 1.8–8)
NEUTS SEG NFR BLD: 84 % (ref 32–75)
NITRITE UR QL STRIP.AUTO: NEGATIVE
NRBC # BLD: 0 K/UL (ref 0–0.01)
NRBC BLD-RTO: 0 PER 100 WBC
PH UR STRIP: 5.5 (ref 5–8)
PLATELET # BLD AUTO: 32 K/UL (ref 150–400)
POTASSIUM SERPL-SCNC: 3.9 MMOL/L (ref 3.5–5.1)
PROT UR STRIP-MCNC: ABNORMAL MG/DL
RBC # BLD AUTO: 2.68 M/UL (ref 3.8–5.2)
RBC #/AREA URNS HPF: ABNORMAL /HPF (ref 0–5)
RBC MORPH BLD: ABNORMAL
SODIUM SERPL-SCNC: 130 MMOL/L (ref 136–145)
SP GR UR REFRACTOMETRY: 1.01
T4 FREE SERPL-MCNC: 0.9 NG/DL (ref 0.8–1.5)
TSH SERPL DL<=0.05 MIU/L-ACNC: 2.02 UIU/ML (ref 0.36–3.74)
URINE CULTURE IF INDICATED: ABNORMAL
UROBILINOGEN UR QL STRIP.AUTO: 1 EU/DL (ref 0.2–1)
WBC # BLD AUTO: 3 K/UL (ref 3.6–11)
WBC URNS QL MICRO: ABNORMAL /HPF (ref 0–4)

## 2024-05-24 PROCEDURE — 84443 ASSAY THYROID STIM HORMONE: CPT

## 2024-05-24 PROCEDURE — 85025 COMPLETE CBC W/AUTO DIFF WBC: CPT

## 2024-05-24 PROCEDURE — 84439 ASSAY OF FREE THYROXINE: CPT

## 2024-05-24 PROCEDURE — 6360000002 HC RX W HCPCS: Performed by: INTERNAL MEDICINE

## 2024-05-24 PROCEDURE — 36415 COLL VENOUS BLD VENIPUNCTURE: CPT

## 2024-05-24 PROCEDURE — 80048 BASIC METABOLIC PNL TOTAL CA: CPT

## 2024-05-24 RX ADMIN — METRONIDAZOLE 500 MG: 500 INJECTION, SOLUTION INTRAVENOUS at 04:48

## 2024-05-24 NOTE — PROGRESS NOTES
Occupational Therapy note:    Duplicate orders acknowledged. Chart reviewed, noted cardiology stated family still considering hospice. Spoke with nurse who also states family is still strongly considering home with hospice services at this time.No OT needs. Will sign off and complete order.    Ines Joyce, OTR/L, OTD

## 2024-05-24 NOTE — PROGRESS NOTES
[facility]  [facility] Those [facility]  [phone]     Patient Name: Esther Kaiser  YOB: 1935  Age: 88 y.o.    [facility] RN Note:  Hospice consult received, reviewing chart. Will follow up with Unit Nurse and Care Manager to discuss plan of care, patient status and discharge disposition within the hour.     Patient is out of our service area for home.       Thank you for the opportunity to be of service to this patient.   Carmen Joyce RN  [phone]

## 2024-05-24 NOTE — DISCHARGE SUMMARY
Discharge Summary    Name: Esther Kaiser  [id number]  YOB: 1935 (Age: 88 y.o.)   Date of Admission: 5/22/2024  Date of Discharge: 5/24/2024  Attending Physician: Nancy Andino MD    Discharge Diagnosis:   Syncope likely vasovagal  Symptomatic anemia  Thrombocytopenia  Diarreha/nausea  Elevated lactic acid  PADDY  Mild hyponatremia      Consultations:  IP CONSULT TO HOSPITALIST  IP CONSULT TO ONCOLOGY  IP CONSULT TO SPIRITUAL SERVICES  IP CONSULT TO CARDIOLOGY  IP CONSULT TO HOSPICE      Brief Admission History/Reason for Admission Per Fitz Steiner MD:   Esther Kaiser is a 88 y.o.  female with PMHx significant for bone marrow cancer, dyslipidemia, gastroesophageal reflux disease, the hospital chief complaints of passing out episode and also leg shaking.  Patient was on the toilet when she had an episode of loss of consciousness followed by shaking of both of her legs.  She reported some generalized weakness along with diarrhea in the last few days.  She also reports nausea and vomiting.  Does not report any abdominal pain.  She also reports decreased mobility and also poor oral intake in the last week or so.  Does not report any fever or chills.     On arrival to ED, noted to have borderline low blood pressure of 105/52, rest of the vitals are within normal limits.  On labs sodium is 131, creatinine 1.29, lactic acid of 2.2, troponin is 26.  Chest x-ray shows no acute findings  CBC shows a WBC of 2.5, hemoglobin 6.4 and platelet count of 48.    Brief Hospital Course by Main Problems:     Syncopal episode most likely due to vasovagal syncopal event versus arrhythmia versus symptomatic anemia  -Patient had an episode of passing out while on the toilet associated with some shaking of both of her legs  Troponin negative, check orthostatic vital signs  CT of the head negative, EEG negative for seizures  EKG on admission reviewed, and monitor reviewed, episode of A-fib    *   < > 130*   K 3.5   < > 3.9      < > 105   CO2 22   < > 20*   BUN 25*   < > 13   CREATININE 1.29*   < > 0.81   GLUCOSE 137*   < > 92   CALCIUM 8.1*   < > 7.7*   MG 2.1  --   --     < > = values in this interval not displayed.     Recent Labs     05/24/24  0607   HGB 7.3*   HCT 22.5*   WBC 3.0*   PLT 32*       Discharge Medications:     Medication List        CONTINUE taking these medications      atorvastatin 10 MG tablet  Commonly known as: LIPITOR  Take 1 tablet by mouth daily     Ojjaara 200 MG Tabs  Generic drug: Momelotinib Dihydrochloride  Take 200 mg by mouth daily     Travoprost (MAGY Free) 0.004 % Soln ophthalmic solution  Commonly known as: TRAVATAN Z            ASK your doctor about these medications      pantoprazole 40 MG tablet  Commonly known as: PROTONIX  Take 1 tablet by mouth in the morning and at bedtime                  DISPOSITION:    Home with Family:       Home with HH/PT/OT/RN:    SNF/LTC:    [facility]:    OTHER: Home hospice           Code status:   Recommended diet: cardiac diet  Recommended activity: activity as tolerated  Wound care: None      Follow up with:   Home hospice        Total time in minutes spent coordinating this discharge (includes going over instructions, follow-up, prescriptions, and preparing report for sign off to her PCP) :  35 minutes

## 2024-05-24 NOTE — CARE COORDINATION
Cleared for D/C from CM standpoint    Transition of Care Plan:    RUR: 16%  Prior Level of Functioning: needs assistance   Disposition: home with [facility]  Follow up appointments: deferred due to hospice   DME needed: hospice to deliver as needed   Transportation at discharge: family at bedside   IM/IMM Medicare/ letter given: given  Is patient a [place] and connected with [facility]? no   If yes, was  transfer form completed and VA notified?   Caregiver Contact: Verito (daughter)  Discharge Caregiver contacted prior to discharge? yes  Care Conference needed? no  Barriers to discharge:  none    Chart reviewed. CM aware of discharge order. Spoke with Verito lagos, this AM by phone to obtain hospice choice. No preference indicated. Referral sent to [facility] unable to staff service area. Additional referral sent to [facility] and they are able to accept pt for home hospice. Daughter updated and in agreement with d/c plan. Daughter to transport pt home today. Spoke with Nicole at [facility]- she will contact family with ETA of nursing visit this PM. No further CM needs identified at this time.        05/24/24 1520   Services At/After Discharge   Transition of Care Consult (CM Consult) Discharge Planning   Services At/After Discharge Hospice    Resource Information Provided? No   Mode of Transport at Discharge Other (see comment)  (family)   Confirm Follow Up Transport Family   Condition of Participation: Discharge Planning   The Plan for Transition of Care is related to the following treatment goals: return home with hospice   The Patient and/or Patient Representative was provided with a Choice of Provider? Patient;Patient Representative   Name of the Patient Representative who was provided with the Choice of Provider and agrees with the Discharge Plan?  Verito Lagos Amaurydulce maria   The Patient and/Or Patient Representative agree with the Discharge Plan? Yes   [place]

## 2024-05-24 NOTE — PLAN OF CARE
Problem: Discharge Planning  Goal: Discharge to home or other facility with appropriate resources  5/24/2024 0959 by Naa Calderón RN  Outcome: Progressing  5/24/2024 0325 by Jonas Watson RN  Outcome: Progressing     Problem: Safety - Adult  Goal: Free from fall injury  5/24/2024 0959 by Naa Calderón RN  Outcome: Progressing  5/24/2024 0325 by Jonas Watson RN  Outcome: Progressing     Problem: ABCDS Injury Assessment  Goal: Absence of physical injury  5/24/2024 0959 by Naa Calderón RN  Outcome: Progressing  5/24/2024 0325 by Jonas Watson RN  Outcome: Progressing

## 2024-05-24 NOTE — PROGRESS NOTES
Physical Therapy    Duplicate orders acknowledged. Chart reviewed, noted cardiology stated family still considering hospice. Spoke with nurse who also states family is still strongly considering home with hospice services at this time.Will sign off and complete order.    Michelle Ron, PT, DPT

## 2024-05-25 LAB
BACTERIA SPEC CULT: NORMAL
BACTERIA SPEC CULT: NORMAL
SERVICE CMNT-IMP: NORMAL
SERVICE CMNT-IMP: NORMAL

## 2024-05-28 ENCOUNTER — TELEPHONE (OUTPATIENT)
Age: 89
End: 2024-05-28

## 2024-05-28 NOTE — TELEPHONE ENCOUNTER
Care Transitions Initial Follow Up Call    Outreach made within 2 business days of discharge: Yes    Patient: Esther Kaiser Patient : 1935   MRN: [id number]  Reason for Admission: There are no discharge diagnoses documented for the most recent discharge.  Discharge Date: 24       Spoke with:  Verito Daniel    Discharge department/facility: [facility] Interactive Patient Contact:  Was patient able to fill all prescriptions: Yes  Was patient instructed to bring all medications to the follow-up visit: Yes  Is patient taking all medications as directed in the discharge summary? Yes  Does patient understand their discharge instructions: Yes  Does patient have questions or concerns that need addressed prior to 7-14 day follow up office visit: no    Scheduled appointment with PCP within 7-14 days  Verito states that they went ahead and placed pt on hospice.    Follow Up  Future Appointments   Date Time Provider Department Center   2024  8:00 AM [facility] CHEMO CHAIR 5 [facility]   2024  8:00 AM [facility] LAB CHAIR 1 [facility]   2024  8:00 AM [facility] MED INF CHAIR 6 [facility]   2024  8:30 AM Zander Elena MD ONC BS AMB   2024  9:00 AM [facility] FASTTRACK 2 [facility]   2024  8:00 AM [facility] MED INF CHAIR 5 [facility]   7/3/2024  8:30 AM [facility] LAB CHAIR 1 [facility]   7/10/2024  8:30 AM [facility] LAB CHAIR 1 [facility]   7/10/2024  8:45 AM Zander Elena MD ONC BS AMB   2024  9:00 AM [facility] LAB CHAIR 1 [facility]   2024  8:30 AM [facility] LAB CHAIR 1 [facility]   2024  8:00 AM [facility] LAB CHAIR 1 [facility]   2024  8:00 AM [facility] LAB CHAIR 1 [facility]   2024  8:00 AM [facility] LAB CHAIR 1 [facility]   2024  8:30 AM [facility] LAB CHAIR 1 [facility]   2024  8:00 AM [facility] LAB CHAIR 1 [facility]       Chacha Manjarrez MA

## 2024-05-29 ENCOUNTER — HOSPITAL ENCOUNTER (OUTPATIENT)
Facility: HOSPITAL | Age: 89
Setting detail: INFUSION SERIES
End: 2024-05-29

## 2024-06-05 ENCOUNTER — HOSPITAL ENCOUNTER (OUTPATIENT)
Facility: HOSPITAL | Age: 89
Setting detail: INFUSION SERIES
End: 2024-06-05

## 2024-06-12 ENCOUNTER — TELEPHONE (OUTPATIENT)
Age: 89
End: 2024-06-12